# Patient Record
Sex: FEMALE | Race: WHITE | NOT HISPANIC OR LATINO | Employment: OTHER | URBAN - METROPOLITAN AREA
[De-identification: names, ages, dates, MRNs, and addresses within clinical notes are randomized per-mention and may not be internally consistent; named-entity substitution may affect disease eponyms.]

---

## 2018-11-01 ENCOUNTER — HOSPITAL ENCOUNTER (INPATIENT)
Facility: MEDICAL CENTER | Age: 83
LOS: 7 days | DRG: 054 | End: 2018-11-08
Attending: EMERGENCY MEDICINE | Admitting: STUDENT IN AN ORGANIZED HEALTH CARE EDUCATION/TRAINING PROGRAM
Payer: MEDICARE

## 2018-11-01 ENCOUNTER — HOSPITAL ENCOUNTER (OUTPATIENT)
Dept: RADIOLOGY | Facility: MEDICAL CENTER | Age: 83
End: 2018-11-01

## 2018-11-01 DIAGNOSIS — M62.82 NON-TRAUMATIC RHABDOMYOLYSIS: ICD-10-CM

## 2018-11-01 DIAGNOSIS — R53.1 WEAKNESS: ICD-10-CM

## 2018-11-01 DIAGNOSIS — G93.89 BRAIN MASS: ICD-10-CM

## 2018-11-01 DIAGNOSIS — I48.91 ATRIAL FIBRILLATION, UNSPECIFIED TYPE (HCC): ICD-10-CM
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
## 2018-11-01 PROBLEM — Z85.038 HISTORY OF COLON CANCER: Chronic | Status: ACTIVE | Noted: 2018-11-01

## 2018-11-01 PROBLEM — R40.20 LOSS OF CONSCIOUSNESS (HCC): Status: ACTIVE | Noted: 2018-11-01

## 2018-11-01 PROBLEM — E80.6 HYPERBILIRUBINEMIA: Status: ACTIVE | Noted: 2018-11-01

## 2018-11-01 PROBLEM — G51.0 BELL PALSY: Chronic | Status: ACTIVE | Noted: 2018-11-01

## 2018-11-01 PROBLEM — D32.9 MENINGIOMA (HCC): Chronic | Status: ACTIVE | Noted: 2018-11-01

## 2018-11-01 PROBLEM — H54.40 BLIND RIGHT EYE: Status: ACTIVE | Noted: 2018-11-01

## 2018-11-01 LAB — GLUCOSE BLD-MCNC: 121 MG/DL (ref 65–99)

## 2018-11-01 PROCEDURE — 700111 HCHG RX REV CODE 636 W/ 250 OVERRIDE (IP): Performed by: STUDENT IN AN ORGANIZED HEALTH CARE EDUCATION/TRAINING PROGRAM

## 2018-11-01 PROCEDURE — 700101 HCHG RX REV CODE 250: Performed by: STUDENT IN AN ORGANIZED HEALTH CARE EDUCATION/TRAINING PROGRAM

## 2018-11-01 PROCEDURE — 700105 HCHG RX REV CODE 258: Performed by: STUDENT IN AN ORGANIZED HEALTH CARE EDUCATION/TRAINING PROGRAM

## 2018-11-01 PROCEDURE — 82962 GLUCOSE BLOOD TEST: CPT

## 2018-11-01 PROCEDURE — 770020 HCHG ROOM/CARE - TELE (206)

## 2018-11-01 PROCEDURE — 99285 EMERGENCY DEPT VISIT HI MDM: CPT

## 2018-11-01 PROCEDURE — 96375 TX/PRO/DX INJ NEW DRUG ADDON: CPT

## 2018-11-01 PROCEDURE — 96374 THER/PROPH/DIAG INJ IV PUSH: CPT

## 2018-11-01 PROCEDURE — 700105 HCHG RX REV CODE 258: Performed by: EMERGENCY MEDICINE

## 2018-11-01 PROCEDURE — 93005 ELECTROCARDIOGRAM TRACING: CPT | Performed by: EMERGENCY MEDICINE

## 2018-11-01 RX ORDER — POLYVINYL ALCOHOL 14 MG/ML
1 SOLUTION/ DROPS OPHTHALMIC PRN
Status: ON HOLD | COMMUNITY
End: 2018-11-08

## 2018-11-01 RX ORDER — DEXAMETHASONE SODIUM PHOSPHATE 4 MG/ML
4 INJECTION, SOLUTION INTRA-ARTICULAR; INTRALESIONAL; INTRAMUSCULAR; INTRAVENOUS; SOFT TISSUE EVERY 6 HOURS
Status: DISCONTINUED | OUTPATIENT
Start: 2018-11-02 | End: 2018-11-02

## 2018-11-01 RX ORDER — DEXTROSE MONOHYDRATE 25 G/50ML
25 INJECTION, SOLUTION INTRAVENOUS
Status: DISCONTINUED | OUTPATIENT
Start: 2018-11-01 | End: 2018-11-08 | Stop reason: HOSPADM

## 2018-11-01 RX ORDER — LEVOTHYROXINE SODIUM 0.07 MG/1
75 TABLET ORAL
COMMUNITY

## 2018-11-01 RX ORDER — BISACODYL 10 MG
10 SUPPOSITORY, RECTAL RECTAL
Status: DISCONTINUED | OUTPATIENT
Start: 2018-11-01 | End: 2018-11-08 | Stop reason: HOSPADM

## 2018-11-01 RX ORDER — SODIUM CHLORIDE 9 MG/ML
1000 INJECTION, SOLUTION INTRAVENOUS ONCE
Status: COMPLETED | OUTPATIENT
Start: 2018-11-01 | End: 2018-11-01

## 2018-11-01 RX ORDER — IBUPROFEN 200 MG
200 TABLET ORAL EVERY 6 HOURS PRN
COMMUNITY

## 2018-11-01 RX ORDER — DEXAMETHASONE SODIUM PHOSPHATE 4 MG/ML
4 INJECTION, SOLUTION INTRA-ARTICULAR; INTRALESIONAL; INTRAMUSCULAR; INTRAVENOUS; SOFT TISSUE ONCE
Status: COMPLETED | OUTPATIENT
Start: 2018-11-01 | End: 2018-11-01

## 2018-11-01 RX ORDER — AMOXICILLIN 250 MG
2 CAPSULE ORAL 2 TIMES DAILY
Status: DISCONTINUED | OUTPATIENT
Start: 2018-11-01 | End: 2018-11-08 | Stop reason: HOSPADM

## 2018-11-01 RX ORDER — ACETAMINOPHEN 325 MG/1
650 TABLET ORAL EVERY 6 HOURS PRN
Status: DISCONTINUED | OUTPATIENT
Start: 2018-11-01 | End: 2018-11-08 | Stop reason: HOSPADM

## 2018-11-01 RX ORDER — LORAZEPAM 2 MG/ML
1 INJECTION INTRAMUSCULAR
Status: DISCONTINUED | OUTPATIENT
Start: 2018-11-01 | End: 2018-11-03

## 2018-11-01 RX ORDER — POLYETHYLENE GLYCOL 3350 17 G/17G
1 POWDER, FOR SOLUTION ORAL
Status: DISCONTINUED | OUTPATIENT
Start: 2018-11-01 | End: 2018-11-08 | Stop reason: HOSPADM

## 2018-11-01 RX ORDER — SODIUM CHLORIDE AND POTASSIUM CHLORIDE 150; 900 MG/100ML; MG/100ML
INJECTION, SOLUTION INTRAVENOUS CONTINUOUS
Status: DISCONTINUED | OUTPATIENT
Start: 2018-11-01 | End: 2018-11-02

## 2018-11-01 RX ADMIN — SODIUM CHLORIDE 1000 ML: 9 INJECTION, SOLUTION INTRAVENOUS at 16:45

## 2018-11-01 RX ADMIN — DEXAMETHASONE SODIUM PHOSPHATE 4 MG: 4 INJECTION, SOLUTION INTRAMUSCULAR; INTRAVENOUS at 17:00

## 2018-11-01 RX ADMIN — POTASSIUM CHLORIDE AND SODIUM CHLORIDE: 900; 150 INJECTION, SOLUTION INTRAVENOUS at 18:38

## 2018-11-01 RX ADMIN — SODIUM CHLORIDE 500 MG: 9 INJECTION, SOLUTION INTRAVENOUS at 17:15

## 2018-11-01 ASSESSMENT — LIFESTYLE VARIABLES
SUBSTANCE_ABUSE: 0
EVER_SMOKED: NEVER

## 2018-11-01 ASSESSMENT — ENCOUNTER SYMPTOMS
DOUBLE VISION: 0
FOCAL WEAKNESS: 0
CHILLS: 0
SHORTNESS OF BREATH: 0
COUGH: 0
DIARRHEA: 0
NAUSEA: 0
SINUS PAIN: 0
SORE THROAT: 0
VOMITING: 0
ABDOMINAL PAIN: 0
SPUTUM PRODUCTION: 0
INSOMNIA: 0
WEAKNESS: 1
MYALGIAS: 0
HEADACHES: 1
TREMORS: 0
DEPRESSION: 0
PALPITATIONS: 0
FALLS: 1
CONSTIPATION: 0
SPEECH CHANGE: 0
ORTHOPNEA: 0
BLURRED VISION: 0
SENSORY CHANGE: 0
BRUISES/BLEEDS EASILY: 0
DIZZINESS: 0
SEIZURES: 0
PND: 0
PHOTOPHOBIA: 0
BACK PAIN: 0
FEVER: 0
NERVOUS/ANXIOUS: 0
HALLUCINATIONS: 0
BLOOD IN STOOL: 0
DIAPHORESIS: 0

## 2018-11-01 ASSESSMENT — COGNITIVE AND FUNCTIONAL STATUS - GENERAL
DRESSING REGULAR LOWER BODY CLOTHING: A LOT
MOBILITY SCORE: 12
DRESSING REGULAR UPPER BODY CLOTHING: A LOT
HELP NEEDED FOR BATHING: A LOT
DAILY ACTIVITIY SCORE: 13
SUGGESTED CMS G CODE MODIFIER MOBILITY: CL
TOILETING: A LOT
STANDING UP FROM CHAIR USING ARMS: A LOT
PERSONAL GROOMING: A LOT
WALKING IN HOSPITAL ROOM: A LOT
TURNING FROM BACK TO SIDE WHILE IN FLAT BAD: A LOT
SUGGESTED CMS G CODE MODIFIER DAILY ACTIVITY: CL
MOVING TO AND FROM BED TO CHAIR: A LOT
MOVING FROM LYING ON BACK TO SITTING ON SIDE OF FLAT BED: A LOT
CLIMB 3 TO 5 STEPS WITH RAILING: A LOT
EATING MEALS: A LITTLE

## 2018-11-01 ASSESSMENT — PAIN SCALES - GENERAL
PAINLEVEL_OUTOF10: 0

## 2018-11-01 ASSESSMENT — COPD QUESTIONNAIRES: HAVE YOU SMOKED AT LEAST 100 CIGARETTES IN YOUR ENTIRE LIFE: NO/DON'T KNOW

## 2018-11-01 NOTE — ED PROVIDER NOTES
ED Provider Note    Scribed for Seamus Lawson M.D. by Reagan Camacho. 11/1/2018, 3:02 PM.    Primary care provider: Pcp Unobtainable By   Means of arrival: Ambulance  History obtained from: Patient  History limited by: Patient's altered level of consciousness.      CHIEF COMPLAINT  Chief Complaint   Patient presents with   • Possible Stroke     Transfer from [facility] due to brain tumor with midline shift and stroke like symptoms (right facial droop).  AOx2     HPI  Cris Pham is a 84 y.o. female who was transferred to the Emergency Department from [facility] with stroke like symptoms of right facial droop.    Per transfer report, the patient was found down by her neighbors, and was last seen at her baseline two days prior. The patient could not get off the floor and was seen at [facility].   The patient was noted to have right sided facial paralysis that is old. Her CT showed meningioma with midline shift, and she was transferred to this ED.     Transfer Labs: Complete metabolic panel:   chloride 100.   Anion gap 12.   Glucose 108.   Total protein 6.2.   Tbili 3.2 otherwise normal.   CPK is 659.   BNP is 243.  Venous pH 7.34.  Normal lipase. Alcohol non detectable. Normal TSH, Non detectable troponin.  WBC and hemoglobin are normal. UA shows no signs of infection.   Transfer EKG shows: Sinus tachycardia with rate of 111.     Further history of present illness is limited secondary to altered level of consciousness.       REVIEW OF SYSTEMS  Review of Systems   Unable to perform ROS: Mental acuity       PAST MEDICAL HISTORY   has a past medical history of Asthma; Heart murmur; Mitral regurgitation (1/17/2012); and UTI (lower urinary tract infection) (1/18/2012).    SURGICAL HISTORY   has a past surgical history that includes other orthopedic surgery.    SOCIAL HISTORY  Social History   Substance Use Topics   • Smoking status: Never Smoker   • Alcohol use No      History    Drug Use No     FAMILY HISTORY  None noted.     CURRENT MEDICATIONS  Reviewed. See ED encounter.     ALLERGIES  No Known Allergies    PHYSICAL EXAM  VITAL SIGNS: /74   Pulse (!) 111   Temp 36.7 °C (98.1 °F)   Resp 16   Wt 68 kg (150 lb)   SpO2 99%   BMI 24.40 kg/m²     Constitutional:  Awake within appropriate quesitoning, does not know the date.    HENT: Moist mucous membranes  Eyes: No conjunctivitis or icterus  Neck: trachea is midline, no palpable thyroid  Lymphatic:No cervical lymphadenopathy  Cardiovascular: Tachycardic rate, no murmurs  Thorax & Lungs: Normal breath sounds, no rhonchi  Abdomen: Soft, Non-tender  Skin:. no rash  Back: Non-tender, no CVA tenderness  Extremities:  Good DP and PT pulses. No edema  Vascular:  symmetric radial pulse  Neurologic: Complete paralysis of the right face With no blinking of the right eye lid. Diffuse non focal weakness, no other obvious cranial abnormality. Could not complete further neurological exam due to patient's inability to cooperate.     LABS  Labs Reviewed - No data to display  All labs reviewed by me.    EKG Interpretation  Interpreted by me  Ventricularly paced rhythm at a rate of 100.     RADIOLOGY  OUTSIDE IMAGES-DX CHEST   Final Result      OUTSIDE IMAGES-CT HEAD   Final Result        The radiologist's interpretation of all radiological studies have been reviewed by me.    COURSE & MEDICAL DECISION MAKING  Pertinent Labs & Imaging studies reviewed. (See chart for details)    3:02 PM - Patient seen and examined at bedside. Ordered EKG to evaluate her symptoms. .    The patient was treated with 250cc IV fluids per hour over a 4 hours for her elevated CPK of 659.     4:16 PM Paged [facility].     4:21 PM Discussed patient's condition with [facility].     Spoke with , Neurosurgery, about the patient's condition.     There was a improved hydration response response to IV fluids.       Medical Decision Making:   The  patient has a large meningioma masses increased in size with vasogenic edema and midline shift.  She has rhabdomyolysis I started IV fluids at 250 cc an hour for 4 hours.  Atrial fib with RVR was treated at the prior facility.  Patient is being given Decadron by the internal medicine resident team.  Of contacted Dr. Mayer for admission    Disposition:  Patient will be admitted to the hospital under the care of [facility] in guarded condition.     FINAL IMPRESSION  1. Weakness    2. Non-traumatic rhabdomyolysis    3. Brain mass    4. Atrial fibrillation, unspecified type (HCC)          IReagan (Scribe), am scribing for, and in the presence of, Seamus Lawson M.D..    Electronically signed by: Reagan Camacho (Scribe), 11/1/2018    ISeamus M.D. personally performed the services described in this documentation, as scribed by Reagan Camacho in my presence, and it is both accurate and complete.    The note accurately reflects work and decisions made by me.  Seamus Lawson  11/1/2018  5:16 PM

## 2018-11-02 PROBLEM — E03.9 HYPOTHYROIDISM: Chronic | Status: ACTIVE | Noted: 2018-11-02

## 2018-11-02 LAB
ALBUMIN SERPL BCP-MCNC: 4 G/DL (ref 3.2–4.9)
ALBUMIN/GLOB SERPL: 1.9 G/DL
ALP SERPL-CCNC: 62 U/L (ref 30–99)
ALT SERPL-CCNC: 15 U/L (ref 2–50)
ANION GAP SERPL CALC-SCNC: 9 MMOL/L (ref 0–11.9)
AST SERPL-CCNC: 24 U/L (ref 12–45)
BASOPHILS # BLD AUTO: 0.1 % (ref 0–1.8)
BASOPHILS # BLD: 0.01 K/UL (ref 0–0.12)
BILIRUB SERPL-MCNC: 1.8 MG/DL (ref 0.1–1.5)
BUN SERPL-MCNC: 34 MG/DL (ref 8–22)
CALCIUM SERPL-MCNC: 9.8 MG/DL (ref 8.5–10.5)
CHLORIDE SERPL-SCNC: 107 MMOL/L (ref 96–112)
CO2 SERPL-SCNC: 24 MMOL/L (ref 20–33)
CREAT SERPL-MCNC: 0.89 MG/DL (ref 0.5–1.4)
EKG IMPRESSION: NORMAL
EKG IMPRESSION: NORMAL
EOSINOPHIL # BLD AUTO: 0 K/UL (ref 0–0.51)
EOSINOPHIL NFR BLD: 0 % (ref 0–6.9)
ERYTHROCYTE [DISTWIDTH] IN BLOOD BY AUTOMATED COUNT: 41 FL (ref 35.9–50)
GLOBULIN SER CALC-MCNC: 2.1 G/DL (ref 1.9–3.5)
GLUCOSE SERPL-MCNC: 125 MG/DL (ref 65–99)
HCT VFR BLD AUTO: 41.7 % (ref 37–47)
HGB BLD-MCNC: 14.2 G/DL (ref 12–16)
IMM GRANULOCYTES # BLD AUTO: 0.01 K/UL (ref 0–0.11)
IMM GRANULOCYTES NFR BLD AUTO: 0.1 % (ref 0–0.9)
INR PPP: 1.09 (ref 0.87–1.13)
LYMPHOCYTES # BLD AUTO: 0.37 K/UL (ref 1–4.8)
LYMPHOCYTES NFR BLD: 5.1 % (ref 22–41)
MCH RBC QN AUTO: 29.6 PG (ref 27–33)
MCHC RBC AUTO-ENTMCNC: 34.1 G/DL (ref 33.6–35)
MCV RBC AUTO: 87.1 FL (ref 81.4–97.8)
MONOCYTES # BLD AUTO: 0.17 K/UL (ref 0–0.85)
MONOCYTES NFR BLD AUTO: 2.3 % (ref 0–13.4)
NEUTROPHILS # BLD AUTO: 6.71 K/UL (ref 2–7.15)
NEUTROPHILS NFR BLD: 92.4 % (ref 44–72)
NRBC # BLD AUTO: 0 K/UL
NRBC BLD-RTO: 0 /100 WBC
PLATELET # BLD AUTO: 186 K/UL (ref 164–446)
PMV BLD AUTO: 10.1 FL (ref 9–12.9)
POTASSIUM SERPL-SCNC: 4.3 MMOL/L (ref 3.6–5.5)
PROT SERPL-MCNC: 6.1 G/DL (ref 6–8.2)
PROTHROMBIN TIME: 14.2 SEC (ref 12–14.6)
RBC # BLD AUTO: 4.79 M/UL (ref 4.2–5.4)
SODIUM SERPL-SCNC: 140 MMOL/L (ref 135–145)
T4 FREE SERPL-MCNC: 1.19 NG/DL (ref 0.53–1.43)
TSH SERPL DL<=0.005 MIU/L-ACNC: 0.65 UIU/ML (ref 0.38–5.33)
WBC # BLD AUTO: 7.3 K/UL (ref 4.8–10.8)

## 2018-11-02 PROCEDURE — G8979 MOBILITY GOAL STATUS: HCPCS | Mod: CI

## 2018-11-02 PROCEDURE — 99221 1ST HOSP IP/OBS SF/LOW 40: CPT | Mod: AI,GC | Performed by: STUDENT IN AN ORGANIZED HEALTH CARE EDUCATION/TRAINING PROGRAM

## 2018-11-02 PROCEDURE — 700102 HCHG RX REV CODE 250 W/ 637 OVERRIDE(OP): Performed by: STUDENT IN AN ORGANIZED HEALTH CARE EDUCATION/TRAINING PROGRAM

## 2018-11-02 PROCEDURE — 97166 OT EVAL MOD COMPLEX 45 MIN: CPT

## 2018-11-02 PROCEDURE — G8997 SWALLOW GOAL STATUS: HCPCS | Mod: CI

## 2018-11-02 PROCEDURE — 36415 COLL VENOUS BLD VENIPUNCTURE: CPT

## 2018-11-02 PROCEDURE — 97162 PT EVAL MOD COMPLEX 30 MIN: CPT

## 2018-11-02 PROCEDURE — 700111 HCHG RX REV CODE 636 W/ 250 OVERRIDE (IP): Performed by: STUDENT IN AN ORGANIZED HEALTH CARE EDUCATION/TRAINING PROGRAM

## 2018-11-02 PROCEDURE — G8978 MOBILITY CURRENT STATUS: HCPCS | Mod: CJ

## 2018-11-02 PROCEDURE — 84439 ASSAY OF FREE THYROXINE: CPT

## 2018-11-02 PROCEDURE — 85610 PROTHROMBIN TIME: CPT

## 2018-11-02 PROCEDURE — A9270 NON-COVERED ITEM OR SERVICE: HCPCS | Performed by: STUDENT IN AN ORGANIZED HEALTH CARE EDUCATION/TRAINING PROGRAM

## 2018-11-02 PROCEDURE — 85025 COMPLETE CBC W/AUTO DIFF WBC: CPT

## 2018-11-02 PROCEDURE — 93005 ELECTROCARDIOGRAM TRACING: CPT | Performed by: STUDENT IN AN ORGANIZED HEALTH CARE EDUCATION/TRAINING PROGRAM

## 2018-11-02 PROCEDURE — 84443 ASSAY THYROID STIM HORMONE: CPT

## 2018-11-02 PROCEDURE — G8996 SWALLOW CURRENT STATUS: HCPCS | Mod: CJ

## 2018-11-02 PROCEDURE — G8988 SELF CARE GOAL STATUS: HCPCS | Mod: CI

## 2018-11-02 PROCEDURE — 80053 COMPREHEN METABOLIC PANEL: CPT

## 2018-11-02 PROCEDURE — 93010 ELECTROCARDIOGRAM REPORT: CPT | Performed by: INTERNAL MEDICINE

## 2018-11-02 PROCEDURE — G8987 SELF CARE CURRENT STATUS: HCPCS | Mod: CK

## 2018-11-02 PROCEDURE — 700105 HCHG RX REV CODE 258: Performed by: STUDENT IN AN ORGANIZED HEALTH CARE EDUCATION/TRAINING PROGRAM

## 2018-11-02 PROCEDURE — 92610 EVALUATE SWALLOWING FUNCTION: CPT

## 2018-11-02 PROCEDURE — 770020 HCHG ROOM/CARE - TELE (206)

## 2018-11-02 PROCEDURE — 700101 HCHG RX REV CODE 250: Performed by: STUDENT IN AN ORGANIZED HEALTH CARE EDUCATION/TRAINING PROGRAM

## 2018-11-02 RX ORDER — ASPIRIN 325 MG
325 TABLET ORAL ONCE
Status: COMPLETED | OUTPATIENT
Start: 2018-11-02 | End: 2018-11-02

## 2018-11-02 RX ORDER — LORAZEPAM 1 MG/1
0.5 TABLET ORAL ONCE
Status: DISCONTINUED | OUTPATIENT
Start: 2018-11-02 | End: 2018-11-02

## 2018-11-02 RX ORDER — ASPIRIN 81 MG/1
81 TABLET, CHEWABLE ORAL DAILY
Status: DISCONTINUED | OUTPATIENT
Start: 2018-11-03 | End: 2018-11-08 | Stop reason: HOSPADM

## 2018-11-02 RX ORDER — POLYVINYL ALCOHOL 14 MG/ML
1 SOLUTION/ DROPS OPHTHALMIC 2 TIMES DAILY
Status: DISCONTINUED | OUTPATIENT
Start: 2018-11-03 | End: 2018-11-08 | Stop reason: HOSPADM

## 2018-11-02 RX ORDER — LORAZEPAM 0.5 MG/1
0.5 TABLET ORAL
Status: DISCONTINUED | OUTPATIENT
Start: 2018-11-02 | End: 2018-11-03

## 2018-11-02 RX ORDER — DILTIAZEM HYDROCHLORIDE 5 MG/ML
10 INJECTION INTRAVENOUS ONCE
Status: DISCONTINUED | OUTPATIENT
Start: 2018-11-02 | End: 2018-11-02

## 2018-11-02 RX ORDER — LEVOTHYROXINE SODIUM 0.07 MG/1
75 TABLET ORAL
Status: DISCONTINUED | OUTPATIENT
Start: 2018-11-02 | End: 2018-11-08 | Stop reason: HOSPADM

## 2018-11-02 RX ORDER — SODIUM CHLORIDE 9 MG/ML
INJECTION, SOLUTION INTRAVENOUS CONTINUOUS
Status: DISCONTINUED | OUTPATIENT
Start: 2018-11-02 | End: 2018-11-03

## 2018-11-02 RX ORDER — POLYVINYL ALCOHOL 14 MG/ML
1 SOLUTION/ DROPS OPHTHALMIC PRN
Status: DISCONTINUED | OUTPATIENT
Start: 2018-11-02 | End: 2018-11-02

## 2018-11-02 RX ADMIN — SODIUM CHLORIDE 500 MG: 9 INJECTION, SOLUTION INTRAVENOUS at 05:56

## 2018-11-02 RX ADMIN — ASPIRIN 325 MG: 325 TABLET, COATED ORAL at 14:41

## 2018-11-02 RX ADMIN — SODIUM CHLORIDE: 9 INJECTION, SOLUTION INTRAVENOUS at 19:51

## 2018-11-02 RX ADMIN — DEXAMETHASONE SODIUM PHOSPHATE 4 MG: 4 INJECTION, SOLUTION INTRAMUSCULAR; INTRAVENOUS at 00:22

## 2018-11-02 RX ADMIN — ACETAMINOPHEN 650 MG: 325 TABLET, FILM COATED ORAL at 19:50

## 2018-11-02 RX ADMIN — DEXAMETHASONE SODIUM PHOSPHATE 4 MG: 4 INJECTION, SOLUTION INTRAMUSCULAR; INTRAVENOUS at 05:56

## 2018-11-02 RX ADMIN — ACETAMINOPHEN 650 MG: 325 TABLET, FILM COATED ORAL at 13:34

## 2018-11-02 RX ADMIN — SODIUM CHLORIDE: 9 INJECTION, SOLUTION INTRAVENOUS at 16:31

## 2018-11-02 RX ADMIN — POLYVINYL ALCOHOL 1 DROP: 14 SOLUTION/ DROPS OPHTHALMIC at 13:31

## 2018-11-02 RX ADMIN — POTASSIUM CHLORIDE AND SODIUM CHLORIDE: 900; 150 INJECTION, SOLUTION INTRAVENOUS at 05:57

## 2018-11-02 ASSESSMENT — GAIT ASSESSMENTS
ASSISTIVE DEVICE: FRONT WHEEL WALKER
GAIT LEVEL OF ASSIST: STAND BY ASSIST
DEVIATION: BRADYKINETIC;ATAXIC
DISTANCE (FEET): 80

## 2018-11-02 ASSESSMENT — PAIN SCALES - GENERAL
PAINLEVEL_OUTOF10: 0
PAINLEVEL_OUTOF10: 4
PAINLEVEL_OUTOF10: 0
PAINLEVEL_OUTOF10: 0

## 2018-11-02 ASSESSMENT — ENCOUNTER SYMPTOMS
BLURRED VISION: 0
INSOMNIA: 0
PHOTOPHOBIA: 0
SPUTUM PRODUCTION: 0
MYALGIAS: 0
FOCAL WEAKNESS: 0
PALPITATIONS: 0
NERVOUS/ANXIOUS: 0
ABDOMINAL PAIN: 0
DIZZINESS: 0
SPEECH CHANGE: 0
DEPRESSION: 0
SORE THROAT: 0
VOMITING: 0
SHORTNESS OF BREATH: 0
SEIZURES: 0
HALLUCINATIONS: 0
EYE DISCHARGE: 1
COUGH: 0
WEIGHT LOSS: 0
LOSS OF CONSCIOUSNESS: 0
FEVER: 0
TINGLING: 0
CONSTIPATION: 0
EYE PAIN: 0
BACK PAIN: 0
SENSORY CHANGE: 0
TREMORS: 0
BRUISES/BLEEDS EASILY: 0
DOUBLE VISION: 0
ORTHOPNEA: 0
CHILLS: 0
NAUSEA: 0
HEADACHES: 0
DIARRHEA: 0
SINUS PAIN: 0
EYE REDNESS: 0

## 2018-11-02 ASSESSMENT — COGNITIVE AND FUNCTIONAL STATUS - GENERAL
DRESSING REGULAR LOWER BODY CLOTHING: A LOT
SUGGESTED CMS G CODE MODIFIER DAILY ACTIVITY: CK
SUGGESTED CMS G CODE MODIFIER MOBILITY: CK
STANDING UP FROM CHAIR USING ARMS: A LITTLE
MOBILITY SCORE: 15
MOVING FROM LYING ON BACK TO SITTING ON SIDE OF FLAT BED: A LITTLE
CLIMB 3 TO 5 STEPS WITH RAILING: A LOT
SUGGESTED CMS G CODE MODIFIER DAILY ACTIVITY: CK
DRESSING REGULAR UPPER BODY CLOTHING: A LOT
EATING MEALS: A LITTLE
SUGGESTED CMS G CODE MODIFIER MOBILITY: CK
PERSONAL GROOMING: A LITTLE
TURNING FROM BACK TO SIDE WHILE IN FLAT BAD: A LITTLE
MOVING TO AND FROM BED TO CHAIR: A LITTLE
STANDING UP FROM CHAIR USING ARMS: A LOT
WALKING IN HOSPITAL ROOM: A LITTLE
CLIMB 3 TO 5 STEPS WITH RAILING: A LOT
MOVING FROM LYING ON BACK TO SITTING ON SIDE OF FLAT BED: A LITTLE
TOILETING: A LITTLE
EATING MEALS: A LITTLE
PERSONAL GROOMING: A LOT
MOVING TO AND FROM BED TO CHAIR: A LITTLE
MOBILITY SCORE: 17
DAILY ACTIVITIY SCORE: 14
DRESSING REGULAR UPPER BODY CLOTHING: A LOT
HELP NEEDED FOR BATHING: A LOT
DAILY ACTIVITIY SCORE: 16
TOILETING: A LOT
HELP NEEDED FOR BATHING: A LITTLE
WALKING IN HOSPITAL ROOM: A LOT
TURNING FROM BACK TO SIDE WHILE IN FLAT BAD: A LITTLE
DRESSING REGULAR LOWER BODY CLOTHING: A LITTLE

## 2018-11-02 ASSESSMENT — PATIENT HEALTH QUESTIONNAIRE - PHQ9
1. LITTLE INTEREST OR PLEASURE IN DOING THINGS: NOT AT ALL
SUM OF ALL RESPONSES TO PHQ9 QUESTIONS 1 AND 2: 0
SUM OF ALL RESPONSES TO PHQ9 QUESTIONS 1 AND 2: 0
2. FEELING DOWN, DEPRESSED, IRRITABLE, OR HOPELESS: NOT AT ALL
1. LITTLE INTEREST OR PLEASURE IN DOING THINGS: NOT AT ALL
2. FEELING DOWN, DEPRESSED, IRRITABLE, OR HOPELESS: NOT AT ALL

## 2018-11-02 ASSESSMENT — LIFESTYLE VARIABLES
EVER_SMOKED: NEVER
SUBSTANCE_ABUSE: 0
ALCOHOL_USE: NO

## 2018-11-02 ASSESSMENT — ACTIVITIES OF DAILY LIVING (ADL): TOILETING: INDEPENDENT

## 2018-11-02 NOTE — SENIOR ADMIT NOTE
SENIOR ADMIT NOTE:    CC:  Ground level fall  Large meningioma with mass affect   Atrial fibrillation    HPI in summary:   83 y/o pleasant female that is hard of hearing and only replies accurately if you write something for her and she reads it and responds. She is fully alert and oriented to self, year, but thinks she is at a rehab facility in [place] ([place]). She was found down in home after neighbors called EMS and was then noted to be in paced atrial fibrillation and CT showed large mass with midline shift so patient was transferred to [facility].    Patient describes falling on her bottom due to her left leg buckling and not really being able to get up after that. She did not lose consciousness and was awake the entire time. She said she tried to save her head and instead hurt her rear end. She banged against the wall towards her neighbors so that they would hear her.     She has no acute complaints, but did mention having some bottom pain where she fell.     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 Velasquez Marin in January 2012.     Pertinent physical exam findings:    Patient was able to follow instructions somewhat. Appears to have 4.5/5 b/l upper extremity strength and patient able to lift lower extermities somewhat, unable to assess strength accurately. Moving all extremities and neck as needed. Able to turn to the side in the bed.   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   Normal babinski  Unable to elicit knee reflex, likely related to age  Visual exam limited by patients baseline right vision loss, but able to count fingers with right  eye.     Irregular rhythm with distal pulses present and warm extremities. Lung and abdominal exam unremarkable.     Pertinent studies:   Patients labs from [place] showed unremarkable CBC and CMP other than elevated total bilirubin to >3 with normal LFTs, slight AG of 12, normal TSH, grossly unremarkable ABG.   UA unremarkable.   Outside CXR- left costophrenic angle blunting, sternotomy hardware visible, and what appears to be a mitral valve (given history of rheumatic heart disease), likely mechanical since its visible on CXR as opacity  Most significant study was her outside CT which showed easily visible large mass with midline shift.      Assessment and plan in summary:  Problems list:  Large meningioma with mass affect   Atrial fibrillation  Ground level fall    Plan  Admit to neurosurgery with telemetry  Q4 neuro checks for now  Seizure, fall, aspirations precautions  Diet following SLP eval  IV decadron 4mg q6 and prophylactic keppra 500mg BID  neurosurgery consulted by EDP, Dr. Lyman to evaluate patient, appreciate recs  IVF maintenance at 83      Amauri Jasso- PGY 2  For detailed assessment and plan, please see Intern note

## 2018-11-02 NOTE — H&P
Internal Medicine Admitting History and Physical    Note Author: Catarino Moctezuma M.D.       Name Cris Pham 1934   Age/Sex 84 y.o. female   MRN [id number]   Code Status DNAR/DNI     After 5PM or if no immediate response to page, please call for cross-coverage  Attending/Team: Dr Palm/ Ayanna See Patient List for primary contact information  Call [phone] to page    1st Call - Day Intern (R1):   Dr Moctezuma 2nd Call - Day Sr. 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  She is able to understand speech when it is written out for her - she states that she tripped when her knees buckled and fell down injuring her buttocks but not her head and she was unable to move for several hours. She was able to alert her neighbor who took her to [facility] where her CT and MRI brain showed an increase in size of her meningioma. She remained conscious throughout according to herself and was found to have a highly elevated CPK on presentation but otherwise normal labs including blood alcohol, Troponin and BNP.    Review of Systems   Constitutional: Negative for chills, diaphoresis, fever and malaise/fatigue.   HENT: Positive for hearing loss. Negative for congestion, ear discharge, ear pain, sinus pain, sore throat and tinnitus.         Bilateral profound hearing loss   Eyes: Negative for blurred vision, double vision and photophobia.         Right eye blindness   Respiratory: Negative for cough, sputum production and shortness of breath.    Cardiovascular: Negative for chest pain, palpitations, orthopnea, leg swelling and PND.   Gastrointestinal: Negative for abdominal pain, blood in stool, constipation, diarrhea, melena, nausea and vomiting.   Genitourinary: Negative for dysuria, frequency and urgency.   Musculoskeletal: Positive for falls and joint pain. Negative for back pain and myalgias.        Pain in buttocks  Right Knee joint pain   Skin: Negative for itching and rash.   Neurological: Positive for weakness and headaches. Negative for dizziness, tremors, sensory change, speech change, focal weakness and seizures.        Occasional headache, takes Aleve PRN  Possible LOC   Endo/Heme/Allergies: Negative for environmental allergies. Does not bruise/bleed easily.   Psychiatric/Behavioral: Negative for depression, hallucinations, substance abuse and suicidal ideas. The patient is not nervous/anxious and does not have insomnia.              Past Medical History (Chronic medical problem, known complications and current treatment)    Afib with RVR on pacing  Hypothyroidism  Meningioma  Colon cancer  Right eye blindness  Left eye cataract  B/l hearing loss     Past Surgical History:  Past Surgical History:   Procedure Laterality Date   • OTHER ORTHOPEDIC SURGERY         Current Outpatient Medications:  Home Medications     Reviewed by Betty Urbina (Pharmacy Tech) on 11/01/18 at [employer]  Med List Status: Complete   Medication Last Dose Status   artificial tears 1.4 % Solution  Active   ibuprofen (MOTRIN) 200 MG Tab  Active   levothyroxine (SYNTHROID) 75 MCG Tab  Active                Medication Allergy/Sensitivities:  No Known Allergies      Family History (mandatory)   No family history on file.    Social History (mandatory)   Social History     Social History   • Marital status: Single     Spouse name: N/A   • Number of children: N/A   • Years of  education: N/A     Occupational History   • Not on file.     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  /65   Pulse (!) 113   Temp 36.7 °C (98.1 °F)   Resp 16   Wt 68 kg (150 lb)   SpO2 99%   BMI 24.40 kg/m²   O2 therapy: Pulse Oximetry: 99 %, O2 (LPM): 2, O2 Delivery: Nasal Cannula    Physical Exam   Constitutional: She is oriented to person, place, and time and well-developed, well-nourished, and in no distress. No distress.   HENT:   Head: Normocephalic and atraumatic.   Right Ear: External ear normal.   Left Ear: External ear normal.   Nose: Nose normal.   Eyes: Pupils are equal, round, and reactive to light. Conjunctivae and EOM are normal. Right eye exhibits discharge. Left eye exhibits no discharge. No scleral icterus.   Whitish thick discharge in right eye, non purulent   Neck: Normal range of motion. Neck supple. No JVD present.   Cardiovascular: Regular rhythm, normal heart sounds and intact distal pulses.    No murmur heard.  tachycardia   Pulmonary/Chest: Effort normal and breath sounds normal. No respiratory distress. She has no rales.   Abdominal: Soft. Bowel sounds are normal. She exhibits no distension. There is no tenderness. There is no guarding.   Laparotomy scar in midline   Genitourinary:   Genitourinary Comments: declined   Musculoskeletal: Normal range of motion. She exhibits no edema, tenderness or deformity.   No knee joint tenderness    Lymphadenopathy:     She has no cervical adenopathy.   Neurological: She is alert and oriented to person, place, and time. She displays normal reflexes. A cranial nerve deficit is present. She exhibits normal muscle tone. Coordination normal. GCS score is 15.   Skin: Skin is warm and dry. No rash noted. She is not diaphoretic. No erythema.   Psychiatric: Mood, affect and judgment normal.   Vitals reviewed.        Data Review       Old Records Request:   Completed  Current Records review/summary: Completed    Lab Data Review:  Recent Results (from the past 24 hour(s))   EKG (NOW)    Collection Time: 18  3:35 PM   Result Value Ref Range    Report       [facility] Emergency Dept.    Test Date:  2018  Pt Name:    COLEEN WU               Department: ER  MRN:        [id number]                      Room:       [facility]  Gender:     Female                       Technician: [id number]  :        1934                   Requested By:ALYSIA NO  Order #:    [id number]                    Reading MD:    Measurements  Intervals                                Axis  Rate:       100                          P:          88  IL:         104                          QRS:        37  QRSD:       138                          T:  QT:         236  QTc:        305    Interpretive Statements  VENTRICULAR-PACED COMPLEXES  NO FURTHER RHYTHM ANALYSIS ATTEMPTED DUE TO PACED RHYTHM  LEFT BUNDLE BRANCH BLOCK  ARTIFACT IN LEAD(S) II,aVR,aVF,V1,V2,V4,V5,V6  Compared to ECG 2012 08:37:48  Left bundle-branch block now present  Sinus rhythm no longer present         Imaging/Procedures Review:    Independant Imaging Review: Completed  OUTSIDE IMAGES-DX CHEST   Final Result      OUTSIDE IMAGES-CT HEAD   Final Result           EKG:   EKG Independant Review: Completed  QTc:305, HR: 104, VENTRICULAR-PACED COMPLEXES   NO FURTHER RHYTHM ANALYSIS ATTEMPTED DUE TO PACED RHYTHM   LEFT BUNDLE BRANCH BLOCK     Records  reviewed and summarized in current documentation :  Yes  [facility] teaching service handout given to patient:  No         Assessment/Plan     * Fall from ground level   Assessment & Plan    - pt fell and injured buttocks, did not injure head or lose consciousness per hx  - was unable to move, alerted neighbors  - increase in size of her meningioma; concern for mass effect or seizure causing LOC  - hx of unsteadiness on feet due to knee buckling but possible orthostatic effect  - also possible is CVA, hypoglycemia and hypovolemia    Plan  - telemetry  - aspiration, seizure and fall precautions  - Neurosurgery consult  - SLP evaluation and treat  - Keppra  - Decadron            Atrial fibrillation (HCC)   Assessment & Plan    - pacemaker in situ  - likely secondary to chronic MR  - tachycardia at 110s    Plan  - Telemetry  - consider beta blocker PRN if RVR        Hyperbilirubinemia   Assessment & Plan    - elevated indirect bilirubin  - 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  - CTM        History of colon cancer   Assessment & Plan    - S/P resectional surgery x 3  - currently no GI compliants  - CTM  - consider FOBT        Blind right eye   Assessment & Plan    - unclear etiology per caregiver  - complete loss of vision  - left eye retains vision but has cataract per hx    Plan  - fall precautions  - CTM        Bell palsy   Assessment & Plan    - 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, states secondary to radiation  - pt unable to wrinkle forehead  - Ddx: new onset CVA in MCA territory    Plan  - CTM        Meningioma (HCC)   Assessment & Plan    - diagnosed 2012, managed conservatively and with radiation  - 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 bone  - 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, no current tinnitus  - recent fall/ LOC, possible expansion of mass-   - erosion of petrous bone, midline shift and vasogenic edema, rapid increase in size of lesion      Plan  - Decadron IV  - Keppra loading dose and  scheduled 500 mg BID  - Neurosurgery consult  - Telemetry          Hearing impairment- (present on admission)   Assessment & Plan    - 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            Anticipated Hospital stay:  >2 midnights        Quality Measures  Quality-Core Measures   Reviewed items::  Labs reviewed, Medications reviewed, EKG reviewed and Radiology images reviewed  Paul catheter::  No Paul  DVT prophylaxis - mechanical:  SCDs  Ulcer Prophylaxis::  No    PCP: Pcp Unobtainable By

## 2018-11-02 NOTE — PROGRESS NOTES
Internal Medicine Interval Note  Note Author: Catarino Moctezuma M.D.     Name Cris Pham 1934   Age/Sex 84 y.o. female   MRN [id number]   Code Status DNAR/DNI     After 5PM or if no immediate response to page, please call for cross-coverage  Attending/Team: Dr Palm/ Salena See Patient List for primary contact information  Call [phone] to page    1st Call - Day Intern (R1):   Dr Moctezuma 2nd Call - Day Sr. Resident (R2/R3):   Dr Jasso         Reason for interval visit  (Principal Problem)   Atrial fibrillation  Meningioma  B/l hearing loss  GLF      Interval Problem Daily Status Update  (24 hours, problem oriented, brief subjective history, new lab/imaging data pertinent to that problem)   - Pt seen and examined at bedside this AM, stable and NAD. 30 sec of PSVT overnight.   - Afib with RVR ~ midday today - spontaneously resolved - transferred to telemetry - in sinus tachycardia - ventricular rate in 100s range  - Telemetry to continue - to start Metoprolol tartrate if sustained tachy and BP WNL  - EKG wnl, sinus tachycardia  - Echo ordered  - IVF restarted at maintenance rate  - no motor weakness - NIHSS 0 this AM  - no seizure activity, pt does not want treatment for meningioma  - Neurosurgery Dr Lyman consulted - no need for MRI as pt does not want treatment - no need for Keppra or Decadron at present time as no signs of raised ICP  - PT on board, recommend walker, likely risk of further falls in present state of functionality  - dry eyes secondary to ptosis - eye drops PRN  - CPK to be trended    - Josh De Leon (pt's landlord/ caregiver) - [phone] for further correspondence - he will visit pt on       Review of Systems   Constitutional: Negative for chills, fever and weight loss.   HENT: Positive for hearing loss. Negative for congestion, ear discharge, ear pain, sinus pain, sore throat and tinnitus.    Eyes: Positive for discharge. Negative for blurred vision, double vision,  photophobia, pain and redness.        Blindness in right eye  Mucus discharge from right eye   Respiratory: Negative for cough, sputum production and shortness of breath.    Cardiovascular: Negative for chest pain, palpitations, orthopnea and leg swelling.   Gastrointestinal: Negative for abdominal pain, constipation, diarrhea, nausea and vomiting.   Genitourinary: Negative for dysuria, frequency and urgency.   Musculoskeletal: Negative for back pain, joint pain and myalgias.   Skin: Negative for itching and rash.   Neurological: Negative for dizziness, tingling, tremors, sensory change, speech change, focal weakness, seizures, loss of consciousness and headaches.   Endo/Heme/Allergies: Negative for environmental allergies. Does not bruise/bleed easily.   Psychiatric/Behavioral: Negative for depression, hallucinations, substance abuse and suicidal ideas. The patient is not nervous/anxious and does not have insomnia.        Disposition/Barriers to discharge:   Likely rehab as some instability and concern for immediate independence    Consultants/Specialty  Dr Esmer KIRKLAND - Neurosurgery  PCP: Pcp Unobtainable By       Quality Measures  Quality-Core Measures   Reviewed items::  EKG reviewed, Labs reviewed, Medications reviewed and Radiology images reviewed  Paul catheter::  No Paul  DVT prophylaxis - mechanical:  SCDs  Ulcer Prophylaxis::  No          Physical Exam       Vitals:    11/02/18 1300 11/02/18 1435 11/02/18 1500 11/02/18 1545   BP: 125/70 117/67 107/64 111/63   Pulse: (!) 131 (!) 108  (!) 110   Resp:  18  18   Temp:  36.8 °C (98.3 °F)  36.4 °C (97.6 °F)   SpO2:  91%  92%   Weight:  67.7 kg (149 lb 4 oz)     Height:         Body mass index is 24.84 kg/m². Weight: 67.7 kg (149 lb 4 oz)  Oxygen Therapy:  Pulse Oximetry: 92 %, O2 (LPM): 0, O2 Delivery: None (Room Air)    Physical Exam   Constitutional: She is oriented to person, place, and time and well-developed, well-nourished, and in no distress. No  distress.   HENT:   Head: Normocephalic and atraumatic.   Right Ear: External ear normal.   Left Ear: External ear normal.   Nose: Nose normal.   Mouth/Throat: No oropharyngeal exudate.   Eyes: Pupils are equal, round, and reactive to light. Conjunctivae and EOM are normal. Right eye exhibits no discharge. Left eye exhibits no discharge. No scleral icterus.   Right sided ptosis   Neck: Normal range of motion. Neck supple. No JVD present.   Cardiovascular: Normal heart sounds and intact distal pulses.    No murmur heard.  Afib --> sinus tachycardia   Pulmonary/Chest: Effort normal and breath sounds normal. No respiratory distress. She has no rales.   Abdominal: Soft. There is no tenderness. There is no guarding.   Genitourinary:   Genitourinary Comments: declined   Musculoskeletal: Normal range of motion. She exhibits no edema, tenderness or deformity.   Assisted gait - walker   Lymphadenopathy:     She has no cervical adenopathy.   Neurological: She is alert and oriented to person, place, and time. She exhibits normal muscle tone. GCS score is 15.   Skin: Skin is warm and dry. No rash noted. She is not diaphoretic. No erythema.   Psychiatric: Mood and affect normal.   Vitals reviewed.            Assessment/Plan     * Fall from ground level   Assessment & Plan    - pt fell and injured buttocks, did not injure head or lose consciousness per hx  - was unable to move, alerted neighbors  - likely instability causing fall    Plan  - telemetry  - aspiration, seizure and fall precautions          Atrial fibrillation (HCC)   Assessment & Plan    - no pacemaker in situ  - likely secondary to chronic MR  - RVR to 140s, trending downward (104)  - EKG - sinus tachycardia    Plan  - Telemetry  - Echo  - consider Metoprolol scheduled if sustained        Meningioma (HCC)   Assessment & Plan    - diagnosed 2012, managed conservatively and with radiation  - 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 bone  - 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, no current tinnitus  -  recent fall/ LOC, possible expansion of mass-   - erosion of petrous bone, midline shift and vasogenic edema, rapid increase in size of lesion  - pt refuses treatment of meningioma, Neurosurgery recommends discontinuation of Keppra and Decadron as no evidence of raised ICP    Plan  - Telemetry          Hypothyroidism   Assessment & Plan    - TSH 0.65, free T4 1.19, WNL  - continue home dose of Synthroid 75 mcg        Hyperbilirubinemia   Assessment & Plan    - elevated indirect bilirubin  - 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  - CTM        History of colon cancer   Assessment & Plan    - S/P resectional surgery x 3  - currently no GI compliants  - CTM  - consider FOBT        Blind right eye   Assessment & Plan    - unclear etiology per caregiver  - complete loss of vision  - left eye retains vision but has cataract per hx    Plan  - fall precautions  - CTM        Bell palsy   Assessment & Plan    - 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

## 2018-11-02 NOTE — ASSESSMENT & PLAN NOTE
- unclear etiology per caregiver  - complete loss of vision  - left eye retains vision but has cataract per hx    Plan  - fall precautions  - eye drops for lubrication  - eye patch

## 2018-11-02 NOTE — ASSESSMENT & PLAN NOTE
- profound b/l hearing loss secondary to radiation for her meningioma per caregiver hx  - able to understand written words in print  - oriented x 4, MMSE WNL, expresses understanding, capacity intact

## 2018-11-02 NOTE — PROGRESS NOTES
ER RN's came up to interrogate pacer and could not find a pacemaker. I asked patient if she had one and she said no. Notified MD.

## 2018-11-02 NOTE — CARE PLAN
Problem: Knowledge Deficit  Goal: Knowledge of disease process/condition, treatment plan, diagnostic tests, and medications will improve  Outcome: PROGRESSING AS EXPECTED  POC discussed with patient. All questions answered.

## 2018-11-02 NOTE — THERAPY
"Physical Therapy Evaluation completed.   Bed Mobility:  Supine to Sit: Modified Independent  Transfers: Sit to Stand: Supervised  Gait: Level Of Assist: Stand by Assist with Front-Wheel Walker       Plan of Care: Will benefit from Physical Therapy 2 times per week  Discharge Recommendations: Equipment: Will Continue to Assess for Equipment Needs.    Ms. Pham is an 83 y/o female who presents to acute secondary to fall. Found to have increased size of meningioma but non-operative at this time. Functionally pt is able to perform gait, transfers, and bed mobility without physical assist. She does demonstrate significant lateral instability when standing which makes FWW safest assistive device. Pt informed therapist her L LE frequently deandra when ambulating with her cane, however she refuses to use her FWW at home. Pt withpoor insight into deficits, unable to comprehend that she may be at a new physical baseline. Based on her lack of insight and instability without FWW, is a high fall risk if she were to discharge back to her prior living situation. Anticipate pt will a higher level of care. Due to her cognitive impairments recommend 24/7 supervision such as a group home. Again if she were to discharge home alone would be a high fall risk due to cognitive deficits. As physically she is able to perform all mobility without assistance, PT to follow primarily for DC needs. Will be available for stair training as well if determined cognitively pt is safe to discharge back to [facility].     See \"Rehab Therapy-Acute\" Patient Summary Report for complete documentation.     "

## 2018-11-02 NOTE — PROGRESS NOTES
Patient went into a-fib around 1215pm. A&ox3 and no complaints of acute pain or discomfort. VSS. Patient resting in bed and only complains of pain to her lower back where she fell before she came in. Called [facility] x2 times and got a hold of the MD. MD put in order to admin caridzem IV push but we are unable to push medication on this floor for patient will be transferred to Tele 7. Tele 7 nurses on the way down. Safety precautions maintained.

## 2018-11-02 NOTE — ASSESSMENT & PLAN NOTE
- diagnosed 2012, managed conservatively and with radiation  - 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 bone  - SNHL, no current tinnitus  - erosion of petrous bone, midline shift and vasogenic edema, rapid increase in size of lesion  - asymptomatic  - pt refuses treatment of meningioma  - discontinuation of Keppra and Decadron as no evidence of raised ICP  - if acute changes in mentation or neurological status can re consult neurosurgery

## 2018-11-02 NOTE — PROGRESS NOTES
Monitor room called stating pt has been in sinus tachycardia  HR since she has been on our floor. [facility] yellow paged regarding update and whether or not to still give diltiazem.

## 2018-11-02 NOTE — PROGRESS NOTES
Neurosurgery Progress Note    Subjective:  Seen and assessed by Dr Esmer BHARDWAJ  Communication by reading     Exam:  Pt appropriate historian.  Moves arms equally. R facial droop baseline    BP  Min: 93/56  Max: 139/104  Pulse  Av  Min: 97  Max: 114  Resp  Av.8  Min: 16  Max: 18  Temp  Av.7 °C (98.1 °F)  Min: 36.4 °C (97.5 °F)  Max: 37 °C (98.6 °F)  SpO2  Av.3 %  Min: 93 %  Max: 99 %    No Data Recorded    Recent Labs      18   0307   WBC  7.3   RBC  4.79   HEMOGLOBIN  14.2   HEMATOCRIT  41.7   MCV  87.1   MCH  29.6   MCHC  34.1   RDW  41.0   PLATELETCT  186   MPV  10.1     Recent Labs      18   0307   SODIUM  140   POTASSIUM  4.3   CHLORIDE  107   CO2  24   GLUCOSE  125*   BUN  34*   CREATININE  0.89   CALCIUM  9.8     Recent Labs      18   0833   INR  1.09           Intake/Output       18 0700 - 18 0659 18 0700 - 18 0659      [id number] 6160-8795 Total 8645-0815 6436-3736 Total       Intake    I.V.  1000  913 1913  --  -- --    Volume (mL) (NS infusion 1,000 mL) 1000 -- 1000 -- -- --    Volume (mL) (0.9 % NaCl with KCl 20 mEq infusion) -- 913 913 -- -- --    Total Intake 4772 942 4937 -- -- --       Output    Urine  --  -- --  --  -- --    Number of Times Voided -- 2 x 2 x -- -- --    Total Output -- -- -- -- -- --       Net I/O     0634 568 4293 -- -- --            Intake/Output Summary (Last 24 hours) at 18 1204  Last data filed at 18 0600   Gross per 24 hour   Intake             1913 ml   Output                0 ml   Net             1913 ml            • levothyroxine  75 mcg AM ES   • artificial tears  1 Drop PRN   • LORazepam  0.5 mg Once PRN   • senna-docusate  2 Tab BID    And   • polyethylene glycol/lytes  1 Packet QDAY PRN    And   • magnesium hydroxide  30 mL QDAY PRN    And   • bisacodyl  10 mg QDAY PRN   • Respiratory Care per Protocol   Continuous RT   • acetaminophen  650 mg Q6HRS PRN   • LORazepam  1 mg Q HOUR PRN   •  glucose 4 g  16 g Q15 MIN PRN    And   • dextrose 50%  25 mL Q15 MIN PRN       Assessment and Plan:  Hospital day #2 Meningioma  Prophylactic anticoagulation: yes             Plan:  Seen by Dr Lyman, patient declines surgery. Requests DNR.DNI per nursing. Residents at bedside, Dr Lyman dw Residents.

## 2018-11-02 NOTE — ASSESSMENT & PLAN NOTE
- S/P resectional surgery x 3  - currently no GI complaints  - no surveillance indicated at present

## 2018-11-02 NOTE — CARE PLAN
Problem: Safety  Goal: Will remain free from injury  Outcome: PROGRESSING AS EXPECTED  Fall precautions in place. Bed alarm on. Bed in low position.

## 2018-11-02 NOTE — PROGRESS NOTES
CT reviewed.  NO ACUTE PATHOLOGY.  No need for pt to be kept NPO.  If pt is a candidate for craniotomy, needs MRI brain s/c Gd.

## 2018-11-02 NOTE — ASSESSMENT & PLAN NOTE
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

## 2018-11-02 NOTE — ASSESSMENT & PLAN NOTE
- pt asymptomatic  - no pacemaker in situ  - likely secondary to chronic MR  - currently in sinus rhythm  - CHADSVASC of 3  - Echo: significant gradient across mitral valve concern for MS per Cardiology - rec Warfarin and MATI as outpatient  - currently in sinus rthythm    Plan  - Warfarin to continue, dose adjustment needed per INR  - metoprolol 37.5 BID

## 2018-11-02 NOTE — ASSESSMENT & PLAN NOTE
- no sequelae from fall, no dizziness  - no hx of instability on feet or prior falls  - risks of not anticoagulating greater than risks of bleed from potential future falls    Plan  - aspiration, seizure and fall precautions  - Warfarin

## 2018-11-02 NOTE — CONSULTS
DATE OF SERVICE:  11/02/2018    NEUROSURGICAL CONSULTATION    REFERRING PHYSICIAN:  [facility] internal medicine.    HISTORY OF PRESENT ILLNESS:  An 84-year-old woman with known meningioma of   right middle cranial fossa apparently treated with radiation by my partner,   Dr. Lucero, in the past who is now retired from clinical practice.  She   apparently had a fall, could not get up, was lying down for extended period of   time.  I was consulted because of CT scan findings, which show a large middle   cranial fossa meningioma.  There is some edema.  Currently, the patient   denies any significant headaches.  She has got multiple medical problems.    REVIEW OF SYSTEMS:  No history of bleeding disorder.  She has been living   independently.  She is not on any anticoagulants.    PHYSICAL EXAMINATION:  VITAL SIGNS:  Recorded.  NEUROLOGIC:  The patient is awake, alert.  She is deaf.  She has got a right   seventh nerve palsy.  She communicates by her writing.  She generates good   power in upper and lower extremities.  Pupils are equally round and reactive   to light.  Gait is not tested.    IMPRESSION:  Large right middle cranial fossa meningioma. I asked her if she would want   surgery to have that taken out.  She tells me years ago they talked about   removing it, but felt it was too risky.  At this point in time, I do not see   an absolute need for anything to be done unless she were having severe   headaches or focal neurologic deficit namely hemiparesis.  In the absence of   that, could leave well enough alone.  Surgery in this patient would be high   risk and fraught with potential complications.  If her symptoms worsen, she   has our card and number, can contact me.       ____________________________________     MD LIYAH VASQUEZ / NATALYA    DD:  11/02/2018 13:30:42  DT:  11/02/2018 16:00:54    D#:  [id number]  Job#:  [id number]

## 2018-11-02 NOTE — THERAPY
"Speech Language Therapy Clinical Swallow Evaluation completed.    Functional Status: Patient very [place] therefore this SLP utilized written communication. She reported some difficulty with water at baseline and stated, \"I need to drink very slow.\" Oral motor examination revealed right facial droop (baseline per EMR), lingual deviation to left with elevation, and reduced labial strength for adequate lip seal. PO trials were administered and consisted of ice chips, NTL, puree, thin liquids, and soft solids. Swallow trigger min-moderately delayed and laryngeal elevation weak to palpation. With trials of thin liquids, patient had cough x2, throat clear x2, c/o globus sensation at the level of the sternum, and required 2 swallows to clear bolus (?query esophageal component). Patient stated, \"Oh, I did it again. I drink too fast.\" She consumed subsequent trials of thin liquids via small single sips which resulted in no overt difficulty. Patient consumed NTL, puree, and soft solids with no overt s/sx of aspiration. Trace bolus loss to left noted with puree.     Recommendations - Diet: At this time, patient is presenting with s/sx consistent with min-moderate oropharyngeal dysphagia and would recommend patient initiate a D2/NTL to minimize risk of aspiration. No current CXR on file - consider placing orders. Spoke with MD regarding status, POC, and SLP recs.                             Strategies: Monitor during meals, Assistance needed for meal tray set-up and Head of Bed at 90 Degrees                            Medication Administration: Float whole in puree    Plan of Care: Will benefit from Speech Therapy 5 times per week    Post-Acute Therapy: Discharge to a transitional care facility for continued skilled therapy services.    See \"Rehab Therapy-Acute\" Patient Summary Report for complete documentation.           "

## 2018-11-02 NOTE — ASSESSMENT & PLAN NOTE
- 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, states secondary to radiation  - pt unable to wrinkle forehead

## 2018-11-02 NOTE — ASSESSMENT & PLAN NOTE
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

## 2018-11-02 NOTE — PROGRESS NOTES
This RN arrived to neuro unit. Pt attached to ZOLL monitor. Shows a-fib RVR. VS assessed. /73, , otherwise stable. Pt denies chest pain; only lower back. Medicated per MAR for pain. Assessment completed. Pt sitting comfortably up in bed eating lunch. This RN stays sitting with pt in S185-2 waiting for room T733-2 to be cleaned. Charge RN Mindee to notify this RN of when tele room is clean.

## 2018-11-02 NOTE — PROGRESS NOTES
Pt transferred with this RN to T733-2 on zoll. VSS stable, .  Tele monitor applied. Pt oriented to tele and plan of care discussed unit using white board for communication. A/Ox4, Call light in reach. Bed locked in lowest position with 2 upper bed rails up. Bed alarm on.

## 2018-11-02 NOTE — PROGRESS NOTES
Patient admitted to Neuroscience floor at 1830. Bedside report given to Lavon WAGNER and he will do patient'd admission. A&ox1 to self. Vitals stable. No complaints of pain or discomfort. Safety precautions initiated. Reviewing orders at this time and initiating them.

## 2018-11-02 NOTE — PROGRESS NOTES
Monitor Summary: SR-ST , MO .14, QRS .08, QT .36 with occasional PVC, 10 sec PSVT with HR up to 169 per strip from monitor room

## 2018-11-03 ENCOUNTER — APPOINTMENT (OUTPATIENT)
Dept: CARDIOLOGY | Facility: MEDICAL CENTER | Age: 83
DRG: 054 | End: 2018-11-03
Attending: STUDENT IN AN ORGANIZED HEALTH CARE EDUCATION/TRAINING PROGRAM
Payer: MEDICARE

## 2018-11-03 PROBLEM — I48.0 PAROXYSMAL ATRIAL FIBRILLATION (HCC): Status: ACTIVE | Noted: 2018-11-01

## 2018-11-03 PROBLEM — E80.6 HYPERBILIRUBINEMIA: Status: RESOLVED | Noted: 2018-11-01 | Resolved: 2018-11-03

## 2018-11-03 LAB
ALBUMIN SERPL BCP-MCNC: 3.5 G/DL (ref 3.2–4.9)
ALBUMIN/GLOB SERPL: 1.9 G/DL
ALP SERPL-CCNC: 50 U/L (ref 30–99)
ALT SERPL-CCNC: 12 U/L (ref 2–50)
ANION GAP SERPL CALC-SCNC: 6 MMOL/L (ref 0–11.9)
AST SERPL-CCNC: 18 U/L (ref 12–45)
BASOPHILS # BLD AUTO: 0.2 % (ref 0–1.8)
BASOPHILS # BLD: 0.02 K/UL (ref 0–0.12)
BILIRUB SERPL-MCNC: 0.8 MG/DL (ref 0.1–1.5)
BUN SERPL-MCNC: 44 MG/DL (ref 8–22)
CALCIUM SERPL-MCNC: 9.3 MG/DL (ref 8.5–10.5)
CHLORIDE SERPL-SCNC: 110 MMOL/L (ref 96–112)
CK SERPL-CCNC: 103 U/L (ref 0–154)
CO2 SERPL-SCNC: 25 MMOL/L (ref 20–33)
CREAT SERPL-MCNC: 0.89 MG/DL (ref 0.5–1.4)
EOSINOPHIL # BLD AUTO: 0.04 K/UL (ref 0–0.51)
EOSINOPHIL NFR BLD: 0.5 % (ref 0–6.9)
ERYTHROCYTE [DISTWIDTH] IN BLOOD BY AUTOMATED COUNT: 43.3 FL (ref 35.9–50)
GLOBULIN SER CALC-MCNC: 1.8 G/DL (ref 1.9–3.5)
GLUCOSE SERPL-MCNC: 101 MG/DL (ref 65–99)
HCT VFR BLD AUTO: 38.3 % (ref 37–47)
HGB BLD-MCNC: 12.8 G/DL (ref 12–16)
IMM GRANULOCYTES # BLD AUTO: 0.03 K/UL (ref 0–0.11)
IMM GRANULOCYTES NFR BLD AUTO: 0.4 % (ref 0–0.9)
LV EJECT FRACT  99904: 45
LV EJECT FRACT MOD 2C 99903: 33.24
LV EJECT FRACT MOD 4C 99902: 32.6
LV EJECT FRACT MOD BP 99901: 37.35
LYMPHOCYTES # BLD AUTO: 1.06 K/UL (ref 1–4.8)
LYMPHOCYTES NFR BLD: 13.2 % (ref 22–41)
MAGNESIUM SERPL-MCNC: 1.8 MG/DL (ref 1.5–2.5)
MCH RBC QN AUTO: 29.6 PG (ref 27–33)
MCHC RBC AUTO-ENTMCNC: 33.4 G/DL (ref 33.6–35)
MCV RBC AUTO: 88.7 FL (ref 81.4–97.8)
MONOCYTES # BLD AUTO: 0.71 K/UL (ref 0–0.85)
MONOCYTES NFR BLD AUTO: 8.8 % (ref 0–13.4)
NEUTROPHILS # BLD AUTO: 6.17 K/UL (ref 2–7.15)
NEUTROPHILS NFR BLD: 76.9 % (ref 44–72)
NRBC # BLD AUTO: 0 K/UL
NRBC BLD-RTO: 0 /100 WBC
PLATELET # BLD AUTO: 184 K/UL (ref 164–446)
PMV BLD AUTO: 10 FL (ref 9–12.9)
POTASSIUM SERPL-SCNC: 4.1 MMOL/L (ref 3.6–5.5)
PROT SERPL-MCNC: 5.3 G/DL (ref 6–8.2)
RBC # BLD AUTO: 4.32 M/UL (ref 4.2–5.4)
SODIUM SERPL-SCNC: 141 MMOL/L (ref 135–145)
WBC # BLD AUTO: 8 K/UL (ref 4.8–10.8)

## 2018-11-03 PROCEDURE — 700102 HCHG RX REV CODE 250 W/ 637 OVERRIDE(OP): Performed by: STUDENT IN AN ORGANIZED HEALTH CARE EDUCATION/TRAINING PROGRAM

## 2018-11-03 PROCEDURE — 93306 TTE W/DOPPLER COMPLETE: CPT | Mod: 26 | Performed by: INTERNAL MEDICINE

## 2018-11-03 PROCEDURE — 93306 TTE W/DOPPLER COMPLETE: CPT

## 2018-11-03 PROCEDURE — 770020 HCHG ROOM/CARE - TELE (206)

## 2018-11-03 PROCEDURE — A9270 NON-COVERED ITEM OR SERVICE: HCPCS | Performed by: STUDENT IN AN ORGANIZED HEALTH CARE EDUCATION/TRAINING PROGRAM

## 2018-11-03 PROCEDURE — 99232 SBSQ HOSP IP/OBS MODERATE 35: CPT | Mod: GC | Performed by: STUDENT IN AN ORGANIZED HEALTH CARE EDUCATION/TRAINING PROGRAM

## 2018-11-03 PROCEDURE — 36415 COLL VENOUS BLD VENIPUNCTURE: CPT

## 2018-11-03 PROCEDURE — 80053 COMPREHEN METABOLIC PANEL: CPT

## 2018-11-03 PROCEDURE — 85025 COMPLETE CBC W/AUTO DIFF WBC: CPT

## 2018-11-03 PROCEDURE — 82550 ASSAY OF CK (CPK): CPT

## 2018-11-03 PROCEDURE — 83735 ASSAY OF MAGNESIUM: CPT

## 2018-11-03 RX ADMIN — ACETAMINOPHEN 650 MG: 325 TABLET, FILM COATED ORAL at 12:34

## 2018-11-03 RX ADMIN — ACETAMINOPHEN 650 MG: 325 TABLET, FILM COATED ORAL at 05:19

## 2018-11-03 RX ADMIN — POLYVINYL ALCOHOL 1 DROP: 14 SOLUTION/ DROPS OPHTHALMIC at 05:18

## 2018-11-03 RX ADMIN — ACETAMINOPHEN 650 MG: 325 TABLET, FILM COATED ORAL at 23:09

## 2018-11-03 RX ADMIN — LEVOTHYROXINE SODIUM 75 MCG: 75 TABLET ORAL at 05:19

## 2018-11-03 RX ADMIN — METOPROLOL TARTRATE 12.5 MG: 25 TABLET, FILM COATED ORAL at 11:09

## 2018-11-03 RX ADMIN — POLYVINYL ALCOHOL 1 DROP: 14 SOLUTION/ DROPS OPHTHALMIC at 17:14

## 2018-11-03 RX ADMIN — ASPIRIN 81 MG: 81 TABLET, CHEWABLE ORAL at 05:19

## 2018-11-03 RX ADMIN — METOPROLOL TARTRATE 12.5 MG: 25 TABLET, FILM COATED ORAL at 17:13

## 2018-11-03 ASSESSMENT — PAIN SCALES - GENERAL
PAINLEVEL_OUTOF10: 5
PAINLEVEL_OUTOF10: 5
PAINLEVEL_OUTOF10: 8
PAINLEVEL_OUTOF10: 2
PAINLEVEL_OUTOF10: 0
PAINLEVEL_OUTOF10: 2

## 2018-11-03 ASSESSMENT — ENCOUNTER SYMPTOMS
FEVER: 0
BRUISES/BLEEDS EASILY: 0
SORE THROAT: 0
EYE PAIN: 0
MYALGIAS: 0
VOMITING: 0
LOSS OF CONSCIOUSNESS: 0
COUGH: 0
BACK PAIN: 0
SPUTUM PRODUCTION: 0
SPEECH CHANGE: 0
NERVOUS/ANXIOUS: 0
DIZZINESS: 0
CHILLS: 0
PALPITATIONS: 0
ABDOMINAL PAIN: 0
SENSORY CHANGE: 0
DEPRESSION: 0
TREMORS: 0
EYE REDNESS: 0
HALLUCINATIONS: 0
FOCAL WEAKNESS: 0
INSOMNIA: 0
WEIGHT LOSS: 0
TINGLING: 0
SEIZURES: 0
EYE DISCHARGE: 1
ORTHOPNEA: 0
NAUSEA: 0
SHORTNESS OF BREATH: 0
HEADACHES: 0
CONSTIPATION: 0
DIARRHEA: 0

## 2018-11-03 ASSESSMENT — LIFESTYLE VARIABLES: SUBSTANCE_ABUSE: 0

## 2018-11-03 NOTE — PROGRESS NOTES
Internal Medicine Interval Note  Note Author: Amauri Jasso M.D.     Name Cris Pham 1934   Age/Sex 84 y.o. female   MRN [id number]   Code Status DNAR/DNI     After 5PM or if no immediate response to page, please call for cross-coverage  Attending/Team: Dr Palm/ Salena See Patient List for primary contact information  Call [phone] to page    1st Call - Day Intern (R1):   Dr Moctezuma 2nd Call - Day Sr. Resident (R2/R3):   Dr Jasso         Reason for interval visit  (Principal Problem)   Atrial fibrillation  Meningioma  B/l hearing loss  GLF    Interval Problem Daily Status Update  (24 hours, problem oriented, brief subjective history, new lab/imaging data pertinent to that problem)     - no significant acute events overnight  -Patient was SR but slightly tachy 90-110s overnight with 4 beats Vtach  -Metoprolol 12.5mg BID started  -Patient hesitantly okay with idea of starting Warfarin and has taken it in the past following her valve replacement. She says she was on it for a few years, but unsure why she was taken off.  -echo done, read pending   -patient unhappy about the idea of SNF, SLP recommend SNF and PT recommends SNF or group home placement     - Josh De Leon (pt's landlord/ caregiver) - [phone] for further correspondence - he will visit pt on       Review of Systems   Constitutional: Negative for chills, fever and weight loss.   HENT: Positive for hearing loss. Negative for sore throat.    Eyes: Positive for discharge. Negative for pain and redness.        Blindness in right eye  Mucus discharge from right eye   Respiratory: Negative for cough, sputum production and shortness of breath.    Cardiovascular: Negative for chest pain, palpitations, orthopnea and leg swelling.   Gastrointestinal: Negative for abdominal pain, constipation, diarrhea, nausea and vomiting.   Genitourinary: Negative for dysuria, frequency and urgency.   Musculoskeletal: Negative for back pain, joint pain  and myalgias.        Pain in buttocks region   Skin: Negative for itching and rash.   Neurological: Negative for dizziness, tingling, tremors, sensory change, speech change, focal weakness, seizures, loss of consciousness and headaches.   Endo/Heme/Allergies: Negative for environmental allergies. Does not bruise/bleed easily.   Psychiatric/Behavioral: Negative for depression, hallucinations, substance abuse and suicidal ideas. The patient is not nervous/anxious and does not have insomnia.        Disposition/Barriers to discharge:   Likely rehab as some instability and concern for immediate independence    Consultants/Specialty  Dr Esmer KIRKLAND - Neurosurgery  PCP: Pcp Unobtainable By       Quality Measures  Quality-Core Measures   Reviewed items::  Labs reviewed, Medications reviewed and Radiology images reviewed  Paul catheter::  No Paul  DVT prophylaxis pharmacological::  Enoxaparin (Lovenox)  DVT prophylaxis - mechanical:  SCDs  Ulcer Prophylaxis::  No          Physical Exam       Vitals:    11/03/18 0300 11/03/18 0800 11/03/18 1109 11/03/18 1231   BP: 118/73 125/73 119/72 121/71   Pulse: 97 98 (!) 106 (!) 107   Resp: 16 16  16   Temp: 36.4 °C (97.5 °F) 36.4 °C (97.6 °F)  36.6 °C (97.8 °F)   SpO2: 91% 93%  96%   Weight:       Height:         Body mass index is 25.31 kg/m². Weight: 69 kg (152 lb 1.9 oz)  Oxygen Therapy:  Pulse Oximetry: 96 %, O2 (LPM): 0, O2 Delivery: None (Room Air)    Physical Exam   Constitutional: She is oriented to person, place, and time and well-developed, well-nourished, and in no distress. No distress.   HENT:   Head: Normocephalic and atraumatic.   Nose: Nose normal.   Mouth/Throat: No oropharyngeal exudate.   Eyes: Conjunctivae and EOM are normal. Right eye exhibits discharge. Left eye exhibits no discharge.   Right sided ptosis   Neck: Normal range of motion. Neck supple.   Cardiovascular: Normal rate, regular rhythm and normal heart sounds.  Exam reveals no friction rub.    No  murmur heard.  Pulmonary/Chest: Effort normal. No respiratory distress. She has no wheezes. She has no rales.   Abdominal: Soft. She exhibits no distension. There is no tenderness. There is no guarding.   Genitourinary:   Genitourinary Comments: declined   Musculoskeletal: She exhibits no edema or deformity.   Neurological: She is alert and oriented to person, place, and time.   Skin: Skin is warm and dry. No rash noted. She is not diaphoretic. No erythema.   Psychiatric: Mood and affect normal.   Nursing note and vitals reviewed.            Assessment/Plan     * Fall from ground level   Assessment & Plan    - pt fell and injured buttocks, did not injure head or lose consciousness per hx  - was unable to move, alerted neighbors  - likely instability causing fall    Plan  - telemetry  - aspiration, seizure and fall precautions          Paroxysmal atrial fibrillation (HCC)   Assessment & Plan    - no pacemaker in situ  - likely secondary to chronic MR  - episode of A fib with RVR which converted to sinus tachy a few hours later  - metoprolol 12.5 BID  -CHADSVASC of 3    Plan  - Telemetry  - Echo read pending   -discuss warfarin initiation with patient again, currently hesitant to initiate, but not outright against it        Meningioma (HCC)   Assessment & Plan    - diagnosed 2012, managed conservatively and with radiation  - 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 bone  - 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, no current tinnitus  - recent fall/ LOC, possible expansion of mass-   - erosion of petrous bone, midline shift and vasogenic edema, rapid increase in size of lesion  - pt refuses treatment of meningioma, Neurosurgery recommends discontinuation of Keppra and Decadron as no evidence of raised ICP    -if acute changes in mentation or neurological status can re consult neurosurgery          Hypothyroidism   Assessment & Plan    - TSH 0.65, free T4 1.19, WNL  - continue home dose of Synthroid 75 mcg        History of colon cancer   Assessment &  Plan    - S/P resectional surgery x 3  - currently no GI compliants  - CTM  - consider FOBT        Blind right eye   Assessment & Plan    - unclear etiology per caregiver  - complete loss of vision  - left eye retains vision but has cataract per hx    Plan  - fall precautions  - CTM        Bell palsy   Assessment & Plan    - 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

## 2018-11-03 NOTE — PROGRESS NOTES
2 RN skin check completed with KRISTY Farrell. Elbows red but blanching. Left elbow has small healing scab. Heels are red but blanching. Sacrum red but blanching. Skin is otherwise clean, dry, and intact.

## 2018-11-03 NOTE — CARE PLAN
Problem: Safety  Goal: Will remain free from falls  Outcome: PROGRESSING AS EXPECTED  Pt educated to use call light before getting out of bed. Call light in reach. Bed locked in lowest position with 2 upper bed rails up. Pt encouraged to sit at edge of bed before standing up. No c/o dizziness. Assistance of one using the walker given to help pt to the commode and back to bed. Room floor is free from clutter. Treaded socks on pt. Bed alarm on.     Problem: Knowledge Deficit  Goal: Knowledge of disease process/condition, treatment plan, diagnostic tests, and medications will improve    Intervention: Explain information regarding disease process/condition, treatment plan, diagnostic tests, and medications and document in education  Pt educated on plan of care, medications, pain management, and disease processes. Pt verbalized understanding and was encouraged to ask questions. All questions answered.

## 2018-11-03 NOTE — PROGRESS NOTES
Spoke with patient's contact Josh De Leon. He was update per patient permission. He was aware of most of the situation. He is a home health nurse at [facility] and is very involved with her care and has been for about 5 years he said. He would like her to be discharged home with home health again to pick her up as a pt again. The pt would like to go home as well and really is not wanting to go to SNF per [facility] yellow request. Social work consult placed. [employer] updated.

## 2018-11-04 LAB
ANION GAP SERPL CALC-SCNC: 7 MMOL/L (ref 0–11.9)
BUN SERPL-MCNC: 28 MG/DL (ref 8–22)
CALCIUM SERPL-MCNC: 9.4 MG/DL (ref 8.5–10.5)
CHLORIDE SERPL-SCNC: 109 MMOL/L (ref 96–112)
CO2 SERPL-SCNC: 26 MMOL/L (ref 20–33)
CREAT SERPL-MCNC: 0.8 MG/DL (ref 0.5–1.4)
GLUCOSE SERPL-MCNC: 98 MG/DL (ref 65–99)
POTASSIUM SERPL-SCNC: 3.9 MMOL/L (ref 3.6–5.5)
SODIUM SERPL-SCNC: 142 MMOL/L (ref 135–145)

## 2018-11-04 PROCEDURE — 700102 HCHG RX REV CODE 250 W/ 637 OVERRIDE(OP): Performed by: STUDENT IN AN ORGANIZED HEALTH CARE EDUCATION/TRAINING PROGRAM

## 2018-11-04 PROCEDURE — 99232 SBSQ HOSP IP/OBS MODERATE 35: CPT | Mod: GC | Performed by: STUDENT IN AN ORGANIZED HEALTH CARE EDUCATION/TRAINING PROGRAM

## 2018-11-04 PROCEDURE — 770020 HCHG ROOM/CARE - TELE (206)

## 2018-11-04 PROCEDURE — A9270 NON-COVERED ITEM OR SERVICE: HCPCS | Performed by: STUDENT IN AN ORGANIZED HEALTH CARE EDUCATION/TRAINING PROGRAM

## 2018-11-04 PROCEDURE — 99222 1ST HOSP IP/OBS MODERATE 55: CPT | Performed by: INTERNAL MEDICINE

## 2018-11-04 PROCEDURE — 36415 COLL VENOUS BLD VENIPUNCTURE: CPT

## 2018-11-04 PROCEDURE — 80048 BASIC METABOLIC PNL TOTAL CA: CPT

## 2018-11-04 PROCEDURE — 700111 HCHG RX REV CODE 636 W/ 250 OVERRIDE (IP): Performed by: STUDENT IN AN ORGANIZED HEALTH CARE EDUCATION/TRAINING PROGRAM

## 2018-11-04 RX ORDER — TRAMADOL HYDROCHLORIDE 50 MG/1
50 TABLET ORAL EVERY 6 HOURS PRN
Status: DISCONTINUED | OUTPATIENT
Start: 2018-11-04 | End: 2018-11-08 | Stop reason: HOSPADM

## 2018-11-04 RX ADMIN — ASPIRIN 81 MG: 81 TABLET, CHEWABLE ORAL at 05:15

## 2018-11-04 RX ADMIN — LEVOTHYROXINE SODIUM 75 MCG: 75 TABLET ORAL at 05:14

## 2018-11-04 RX ADMIN — METOPROLOL TARTRATE 25 MG: 25 TABLET, FILM COATED ORAL at 17:19

## 2018-11-04 RX ADMIN — ACETAMINOPHEN 650 MG: 325 TABLET, FILM COATED ORAL at 05:15

## 2018-11-04 RX ADMIN — ACETAMINOPHEN 650 MG: 325 TABLET, FILM COATED ORAL at 14:01

## 2018-11-04 RX ADMIN — POLYVINYL ALCOHOL 1 DROP: 14 SOLUTION/ DROPS OPHTHALMIC at 17:19

## 2018-11-04 RX ADMIN — STANDARDIZED SENNA CONCENTRATE AND DOCUSATE SODIUM 2 TABLET: 8.6; 5 TABLET, FILM COATED ORAL at 05:15

## 2018-11-04 RX ADMIN — TRAMADOL HYDROCHLORIDE 50 MG: 50 TABLET, FILM COATED ORAL at 17:19

## 2018-11-04 RX ADMIN — POLYVINYL ALCOHOL 1 DROP: 14 SOLUTION/ DROPS OPHTHALMIC at 05:24

## 2018-11-04 RX ADMIN — TRAMADOL HYDROCHLORIDE 50 MG: 50 TABLET, FILM COATED ORAL at 09:37

## 2018-11-04 RX ADMIN — ENOXAPARIN SODIUM 40 MG: 100 INJECTION SUBCUTANEOUS at 05:15

## 2018-11-04 RX ADMIN — METOPROLOL TARTRATE 12.5 MG: 25 TABLET, FILM COATED ORAL at 05:15

## 2018-11-04 ASSESSMENT — ENCOUNTER SYMPTOMS
LOSS OF CONSCIOUSNESS: 0
FEVER: 0
TREMORS: 0
FOCAL WEAKNESS: 0
SPEECH CHANGE: 0
DEPRESSION: 0
ORTHOPNEA: 0
SHORTNESS OF BREATH: 0
SPUTUM PRODUCTION: 0
DOUBLE VISION: 0
SINUS PAIN: 0
HEADACHES: 0
MYALGIAS: 0
SEIZURES: 0
NAUSEA: 0
EYE DISCHARGE: 0
EYE REDNESS: 0
CHILLS: 0
VOMITING: 0
BRUISES/BLEEDS EASILY: 0
SENSORY CHANGE: 0
PALPITATIONS: 0
EYE PAIN: 0
CONSTIPATION: 0
NERVOUS/ANXIOUS: 0
DIZZINESS: 0
TINGLING: 0
BACK PAIN: 1
DIARRHEA: 0
WEIGHT LOSS: 0
HALLUCINATIONS: 0
SORE THROAT: 0
INSOMNIA: 0
COUGH: 0
ABDOMINAL PAIN: 0

## 2018-11-04 ASSESSMENT — PAIN SCALES - GENERAL
PAINLEVEL_OUTOF10: 2
PAINLEVEL_OUTOF10: 8
PAINLEVEL_OUTOF10: 7
PAINLEVEL_OUTOF10: 8
PAINLEVEL_OUTOF10: 9
PAINLEVEL_OUTOF10: 5
PAINLEVEL_OUTOF10: 0

## 2018-11-04 ASSESSMENT — LIFESTYLE VARIABLES: SUBSTANCE_ABUSE: 0

## 2018-11-04 NOTE — PROGRESS NOTES
Bedside shift report received. Assumed care of patient at this time. Patient sitting in chair at bedside. Personal items and call light within reach. No further requests at this time.

## 2018-11-04 NOTE — PROGRESS NOTES
Internal Medicine Interval Note  Note Author: Catarino Moctezuma M.D.     Name Cris Pham 1934   Age/Sex 84 y.o. female   MRN [id number]   Code Status DNAR/DNI     After 5PM or if no immediate response to page, please call for cross-coverage  Attending/Team: Dr Palm/ Salena See Patient List for primary contact information  Call [phone] to page    1st Call - Day Intern (R1):   Dr Moctezuma 2nd Call - Day Sr. Resident (R2/R3):   Dr Jasso         Reason for interval visit  (Principal Problem)   Atrial fibrillation  Meningioma  B/l hearing loss  GLF    Interval Problem Daily Status Update  (24 hours, problem oriented, brief subjective history, new lab/imaging data pertinent to that problem)     - Pt seen and examined at bedside this AM, stable and NAD. No events on tele overnight    - No headache, blurred vision, tinnitus    - Metoprolol increased to 25 BID in view of tachycardia and hypertension    - Patient hesitantly okay with idea of starting Warfarin and has taken it in the past following her valve replacement. She says she was on it for a few years, but unsure why she was taken off.    - Echo: significant gradient across mitral valve (13 mmHg) - Cardiology recs outpatient MATI, MS possible - rec Warfarin as risk for CVA remains high    - patient reluctantly agrees to SNF, SLP recommend SNF and PT recommends SNF or group home placement - PT to re-consult regarding placement recs     - Josh De Leon (pt's landlord/ caregiver) - [phone] for further correspondence        Review of Systems   Constitutional: Negative for chills, fever and weight loss.   HENT: Positive for hearing loss. Negative for congestion, sinus pain and sore throat.    Eyes: Negative for double vision, pain, discharge and redness.        Blindness in right eye     Respiratory: Negative for cough, sputum production and shortness of breath.    Cardiovascular: Negative for chest pain, palpitations, orthopnea and leg  swelling.   Gastrointestinal: Negative for abdominal pain, constipation, diarrhea, nausea and vomiting.   Genitourinary: Negative for dysuria, frequency and urgency.   Musculoskeletal: Positive for back pain. Negative for joint pain and myalgias.        Pain in right shoulder and upper back   Skin: Negative for itching and rash.   Neurological: Negative for dizziness, tingling, tremors, sensory change, speech change, focal weakness, seizures, loss of consciousness and headaches.   Endo/Heme/Allergies: Negative for environmental allergies. Does not bruise/bleed easily.   Psychiatric/Behavioral: Negative for depression, hallucinations, substance abuse and suicidal ideas. The patient is not nervous/anxious and does not have insomnia.        Disposition/Barriers to discharge:   Likely SNF/ group home as some instability and concern for immediate independence; [facility]    Consultants/Specialty  Dr Esmer KIRKLAND - Neurosurgery  PCP: Pcp Unobtainable By       Quality Measures  Quality-Core Measures   Reviewed items::  Labs reviewed, Medications reviewed and Radiology images reviewed  Paul catheter::  No Paul  DVT prophylaxis pharmacological::  Enoxaparin (Lovenox)  DVT prophylaxis - mechanical:  SCDs  Ulcer Prophylaxis::  No          Physical Exam       Vitals:    11/04/18 0000 11/04/18 0318 11/04/18 0415 11/04/18 0730   BP: 150/100  (!) 175/102 160/101   Pulse: 98  100 97   Resp: 18  17 (!) 23   Temp: 36.4 °C (97.6 °F)  36.1 °C (97 °F) 36.7 °C (98 °F)   SpO2: 94%  94% 94%   Weight:  69.8 kg (153 lb 14.1 oz)     Height:         Body mass index is 25.61 kg/m². Weight: 69.8 kg (153 lb 14.1 oz)  Oxygen Therapy:  Pulse Oximetry: 94 %, O2 (LPM): 0, O2 Delivery: None (Room Air)    Physical Exam   Constitutional: She is oriented to person, place, and time and well-developed, well-nourished, and in no distress. No distress.   HENT:   Head: Normocephalic and atraumatic.   Nose: Nose normal.    Mouth/Throat: No oropharyngeal exudate.   Eyes: Pupils are equal, round, and reactive to light. Conjunctivae and EOM are normal. Right eye exhibits no discharge. Left eye exhibits no discharge.   Right sided ptosis   Neck: Normal range of motion. Neck supple.   Cardiovascular: Normal rate, regular rhythm and normal heart sounds.  Exam reveals no friction rub.    No murmur heard.  Pulmonary/Chest: Effort normal. No respiratory distress. She has no wheezes. She has no rales.   Abdominal: Soft. She exhibits no distension. There is no tenderness. There is no guarding.   Genitourinary:   Genitourinary Comments: declined   Musculoskeletal: She exhibits no edema or deformity.   Neurological: She is alert and oriented to person, place, and time.   Skin: Skin is warm and dry. No rash noted. She is not diaphoretic. No erythema.   Psychiatric: Mood and affect normal.   Nursing note and vitals reviewed.            Assessment/Plan     * Fall from ground level   Assessment & Plan    - pt fell and injured buttocks, did not injure head or lose consciousness per hx  - was unable to move, alerted neighbors  - likely instability causing fall    Plan  - telemetry  - aspiration, seizure and fall precautions       Paroxysmal atrial fibrillation (HCC)   Assessment & Plan    - no pacemaker in situ  - likely secondary to chronic MR  - episode of A fib with RVR which converted to sinus tachy a few hours later  - metoprolol 25 BID in view of hypertension and tachycardia  -CHADSVASC of 3  -Echo: significant gradient across mitral valve concern for MS per Cardiology - rec Warfarin and MATI as outpatient    Plan  - Telemetry  - consider Warfarin     Meningioma (HCC)   Assessment & Plan    - diagnosed 2012, managed conservatively and with radiation  - 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 bone  - 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, no current tinnitus  - recent fall/ LOC, possible expansion of mass-   - erosion of petrous bone, midline shift and vasogenic edema, rapid  increase in size of lesion  - pt refuses treatment of meningioma, Neurosurgery recommends discontinuation of Keppra and Decadron as no evidence of raised ICP    -if acute changes in mentation or neurological status can re consult neurosurgery       Hypothyroidism   Assessment & Plan    - TSH 0.65, free T4 1.19, WNL  - continue home dose of Synthroid 75 mcg     History of colon cancer   Assessment & Plan    - S/P resectional surgery x 3  - currently no GI compliants  - CTM  - consider FOBT     Blind right eye   Assessment & Plan    - unclear etiology per caregiver  - complete loss of vision  - left eye retains vision but has cataract per hx    Plan  - fall precautions  - CTM     Bell palsy   Assessment & Plan    - 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

## 2018-11-04 NOTE — CARE PLAN
Problem: Knowledge Deficit  Goal: Knowledge of disease process/condition, treatment plan, diagnostic tests, and medications will improve    Intervention: Explain information regarding disease process/condition, treatment plan, diagnostic tests, and medications and document in education  Pt educated on plan of care, medications, pain management, and disease processes. Pt verbalized understanding and was encouraged to ask questions. All questions answered.         Problem: Pain Management  Goal: Pain level will decrease to patient's comfort goal  Outcome: PROGRESSING AS EXPECTED  Pt pain assessed using pain scale and descriptors. Medicated per MAR. Pt complaining of additional pain. Warm blanket and warm pack provided.

## 2018-11-04 NOTE — CONSULTS
Cardiology Consult Note    Date of note:    11/4/2018      Patient ID:    Name:             Cris Pham     YOB: 1934  Age:                 84 y.o.  female   MRN:               [id number]                                                             Consulting Physician: Don Palm MD    Consult question: Evaluation of possible prosthetic valve stenosis    History of Present Illness:      Cris Pham is an 84-year-old woman with history of paroxysmal atrial fibrillation, mitral regurgitation status post mitral valve repair (unclear details around her mitral valve repair to me at the time of this note), and previous stroke who presents with a fall.  Transthoracic echocardiogram was read as borderline severe mitral stenosis, and cardiology was consulted regarding the possibility of a transesophageal echocardiogram.    The patient is blind in her right eye, and hard of hearing after several attempts at communication including with writing on paper and calling her friend and caregiver no reliable history was obtained.  She denied any cardiovascular symptoms, and related her fall.  She did tell me that she previously was on warfarin, and confirmed her history of stroke.    Review of Systems: As much as obtainable, all others reviewed and negative.    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  •  tramadol (ULTRAM) 50 MG tablet 50 mg, 50 mg, Oral, Q6HRS PRN, Ravindra Palm M.D., 50 mg at 11/04/18 0937  •  enoxaparin (LOVENOX) inj 40 mg, 40 mg, Subcutaneous, DAILY, Amauri Jasso M.D., 40 mg at 11/04/18 0515  •  levothyroxine (SYNTHROID) tablet 75 mcg, 75 mcg, Oral, AM ES, Amauri Jasso M.D., 75 mcg at 11/04/18 0514  •  aspirin (ASA) chewable tab 81 mg, 81 mg, Oral, DAILY, Catarino Moctezuma M.D., 81 mg at 11/04/18 0515  •  artificial tears 1.4 % ophthalmic solution 1 Drop, 1 Drop, Right Eye, BID, Amauri Jasso M.D., 1 Drop at 11/04/18 0524  •  senna-docusate (PERICOLACE or SENOKOT S) 8.6-50 MG per tablet 2 Tab, 2 Tab, Oral, BID, 2 Tab at 11/04/18 0515 **AND** polyethylene glycol/lytes (MIRALAX) PACKET 1 Packet, 1 Packet, Oral, QDAY PRN **AND** magnesium hydroxide (MILK OF MAGNESIA) suspension 30 mL, 30 mL, Oral, QDAY PRN **AND** bisacodyl (DULCOLAX) suppository 10 mg, 10 mg, Rectal, QDAY PRN, Amauri Jasso M.D.  •  Respiratory Care per Protocol, , Nebulization, Continuous RT, Amauri Jasso M.D.  •  acetaminophen (TYLENOL) tablet 650 mg, 650 mg, Oral, Q6HRS PRN, Amauri Jasso M.D., 650 mg at 11/04/18 0515  •  Notify, , , Once **AND** glucose 4 g chewable tablet 16 g, 16 g, Oral, Q15 MIN PRN **AND** dextrose 50% (D50W) injection 25 mL, 25 mL, Intravenous, Q15 MIN PRN, Catarino Moctezuma M.D.    Current Outpatient Medications:  Prescriptions Prior to Admission   Medication Sig Dispense Refill Last Dose   • ibuprofen (MOTRIN) 200 MG Tab Take 200 mg by mouth every 6 hours as needed.      • levothyroxine (SYNTHROID) 75 MCG Tab Take 75 mcg by mouth Every morning on an empty stomach.      • artificial tears 1.4 % Solution Place 1 Drop in right eye as needed.          Medication Allergy:  No Known Allergies    Family  "History:  No family history on file.    Social History:  Social History     Social History   • Marital status: Single     Spouse name: N/A   • Number of children: N/A   • Years of education: N/A     Occupational History   • Not on file.     Social History Main Topics   • Smoking status: Never Smoker   • Smokeless tobacco: Not on file   • Alcohol use No   • Drug use: No   • Sexual activity: Not on file     Other Topics Concern   • Not on file     Social History Narrative   • No narrative on file         Physical Exam:   Vitals/   Weight/BMI: Body mass index is 25.61 kg/m².  Blood pressure 160/101, pulse 97, temperature 36.7 °C (98 °F), resp. rate (!) 23, height 1.651 m (5' 5\"), weight 69.8 kg (153 lb 14.1 oz), SpO2 94 %, not currently breastfeeding.  Vitals:    11/04/18 0000 11/04/18 0318 11/04/18 0415 11/04/18 0730   BP: 150/100  (!) 175/102 160/101   Pulse: 98  100 97   Resp: 18  17 (!) 23   Temp: 36.4 °C (97.6 °F)  36.1 °C (97 °F) 36.7 °C (98 °F)   SpO2: 94%  94% 94%   Weight:  69.8 kg (153 lb 14.1 oz)     Height:         Oxygen Therapy:  Pulse Oximetry: 94 %, O2 (LPM): 0, O2 Delivery: None (Room Air)    Physical Exam   Constitutional: She is oriented to person, place, and time. She appears well-developed and well-nourished. No distress.   Pleasant, in no distress   HENT:   Head: Normocephalic and atraumatic.   Eyes: Pupils are equal, round, and reactive to light. Conjunctivae and EOM are normal.   Neck: No JVD present.   Cardiovascular: Normal rate, regular rhythm and normal heart sounds.  Exam reveals no gallop and no friction rub.    No murmur heard.  Pulmonary/Chest: Effort normal and breath sounds normal. No respiratory distress. She has no wheezes. She has no rales.   Abdominal: Soft. Bowel sounds are normal. She exhibits no distension.   Musculoskeletal: She exhibits no edema (no lower extremity edema bilaterally).   Neurological: She is alert and oriented to person, place, and time.   Skin: Skin is warm " and dry. No rash noted. She is not diaphoretic. No erythema. No pallor.   Psychiatric: She has a normal mood and affect. Her behavior is normal. Judgment and thought content normal.   Nursing note and vitals reviewed.      MDM (Data Review):     Records reviewed and summarized in current documentation    Lab Data Review:  Recent Results (from the past 24 hour(s))   BASIC METABOLIC PANEL    Collection Time: 11/04/18  1:47 AM   Result Value Ref Range    Sodium 142 135 - 145 mmol/L    Potassium 3.9 3.6 - 5.5 mmol/L    Chloride 109 96 - 112 mmol/L    Co2 26 20 - 33 mmol/L    Glucose 98 65 - 99 mg/dL    Bun 28 (H) 8 - 22 mg/dL    Creatinine 0.80 0.50 - 1.40 mg/dL    Calcium 9.4 8.5 - 10.5 mg/dL    Anion Gap 7.0 0.0 - 11.9   ESTIMATED GFR    Collection Time: 11/04/18  1:47 AM   Result Value Ref Range    GFR If African American >60 >60 mL/min/1.73 m 2    GFR If Non African American >60 >60 mL/min/1.73 m 2       Imaging/Procedures Review (all reviewed and pertinent for):      EKG (11/1/2018, images and report reviewed, my interpretation): Shows ventricularly paced rhythm with atrial rhythm appearing to be likely 2-1 flutter versus sinus tachycardia.  There was one PVC on that strip.  I reviewed the tracings and report of a repeat EKG on 11/clear sinus tachycardia with a ventricular rate of 107 bpm, and nonspecific high lateral T wave flattening.  That EKG is roughly unchanged compared to a couple of previous EKGs from January 2012.    Echocardiogram, 11/3/2018, images and report reviewed, my interpretation: Shows mild global hypokinesis with a left ventricular ejection fraction of 45%, there was a gradient measured across the calcified bioprosthetic aortic valve at 13 mmHg at a heart rate of 96 bpm.  My review of that tracing shows that it was probably a bit of an over estimation, and I manually measured the mean gradient around 5 mmHg.     Impression and Recommendations:       1.  Moderate calcific mitral stenosis: This is  best managed by slowing the patient's heart rate down, which is a bit on the tachycardic side at this time.  I agree with metoprolol tartrate 25 mg p.o. twice daily using a low dose especially in light of her age and recent falls.  I do not think additional investigation with a transesophageal echocardiogram as needed.  I discussed that with the patient, and we will defer that test.    2.  Paroxysmal atrial fibrillation: In light of her moderate calcific mitral stenosis I do think this is consistent with valvular atrial fibrillation and that she is at very high risk for stroke.  I discussed with her that she absolutely should be back on Coumadin in light of her very high risk of recurrent stroke.  She accepted that recommendation.  I would recommend restarting that medication, and I will send a copy of this note to her outpatient cardiologist so that he can follow her INR levels were arranged for Coumadin clinic follow-up in [place].  I would also continue aspirin at 81 mg p.o. daily for stroke prevention given her bioprosthetic valve.    Hypertension/dyslipidemia: Per the primary service    I have no other recommendations at this time, and I will sign off.    Chong Mccain MD  Cardiologist, [facility] [place]

## 2018-11-04 NOTE — PROGRESS NOTES
Received bedside report from PM RN Jami. Assumed patient care. Chart reviewed. Pt was resting in bed. A&O x 4. BPs are elevated per PM RN. No concerns, complaints or distress.  POC updated with pt and on the patient communication board. Bed locked and in the lowest position. Call light within reach. Tele box on. SR 96 on the monitor. Will continue to monitor.

## 2018-11-05 PROBLEM — T82.857A PROSTHETIC MITRAL VALVE STENOSIS: Status: ACTIVE | Noted: 2018-11-05

## 2018-11-05 LAB
ANION GAP SERPL CALC-SCNC: 4 MMOL/L (ref 0–11.9)
BASOPHILS # BLD AUTO: 0.8 % (ref 0–1.8)
BASOPHILS # BLD: 0.04 K/UL (ref 0–0.12)
BUN SERPL-MCNC: 17 MG/DL (ref 8–22)
CALCIUM SERPL-MCNC: 9.4 MG/DL (ref 8.5–10.5)
CHLORIDE SERPL-SCNC: 105 MMOL/L (ref 96–112)
CO2 SERPL-SCNC: 28 MMOL/L (ref 20–33)
CREAT SERPL-MCNC: 0.71 MG/DL (ref 0.5–1.4)
EKG IMPRESSION: NORMAL
EOSINOPHIL # BLD AUTO: 0.23 K/UL (ref 0–0.51)
EOSINOPHIL NFR BLD: 4.7 % (ref 0–6.9)
ERYTHROCYTE [DISTWIDTH] IN BLOOD BY AUTOMATED COUNT: 41.2 FL (ref 35.9–50)
GLUCOSE SERPL-MCNC: 100 MG/DL (ref 65–99)
HCT VFR BLD AUTO: 39.3 % (ref 37–47)
HGB BLD-MCNC: 13.4 G/DL (ref 12–16)
IMM GRANULOCYTES # BLD AUTO: 0.01 K/UL (ref 0–0.11)
IMM GRANULOCYTES NFR BLD AUTO: 0.2 % (ref 0–0.9)
INR PPP: 1.01 (ref 0.87–1.13)
LYMPHOCYTES # BLD AUTO: 0.93 K/UL (ref 1–4.8)
LYMPHOCYTES NFR BLD: 18.9 % (ref 22–41)
MCH RBC QN AUTO: 29.8 PG (ref 27–33)
MCHC RBC AUTO-ENTMCNC: 34.1 G/DL (ref 33.6–35)
MCV RBC AUTO: 87.3 FL (ref 81.4–97.8)
MONOCYTES # BLD AUTO: 0.5 K/UL (ref 0–0.85)
MONOCYTES NFR BLD AUTO: 10.1 % (ref 0–13.4)
NEUTROPHILS # BLD AUTO: 3.22 K/UL (ref 2–7.15)
NEUTROPHILS NFR BLD: 65.3 % (ref 44–72)
NRBC # BLD AUTO: 0 K/UL
NRBC BLD-RTO: 0 /100 WBC
PLATELET # BLD AUTO: 181 K/UL (ref 164–446)
PMV BLD AUTO: 9.7 FL (ref 9–12.9)
POTASSIUM SERPL-SCNC: 3.9 MMOL/L (ref 3.6–5.5)
PROTHROMBIN TIME: 13.4 SEC (ref 12–14.6)
RBC # BLD AUTO: 4.5 M/UL (ref 4.2–5.4)
SODIUM SERPL-SCNC: 137 MMOL/L (ref 135–145)
WBC # BLD AUTO: 4.9 K/UL (ref 4.8–10.8)

## 2018-11-05 PROCEDURE — A9270 NON-COVERED ITEM OR SERVICE: HCPCS | Performed by: STUDENT IN AN ORGANIZED HEALTH CARE EDUCATION/TRAINING PROGRAM

## 2018-11-05 PROCEDURE — 700102 HCHG RX REV CODE 250 W/ 637 OVERRIDE(OP): Performed by: STUDENT IN AN ORGANIZED HEALTH CARE EDUCATION/TRAINING PROGRAM

## 2018-11-05 PROCEDURE — 92523 SPEECH SOUND LANG COMPREHEN: CPT

## 2018-11-05 PROCEDURE — G8997 SWALLOW GOAL STATUS: HCPCS | Mod: CI

## 2018-11-05 PROCEDURE — 92526 ORAL FUNCTION THERAPY: CPT

## 2018-11-05 PROCEDURE — 85610 PROTHROMBIN TIME: CPT

## 2018-11-05 PROCEDURE — 700111 HCHG RX REV CODE 636 W/ 250 OVERRIDE (IP): Performed by: STUDENT IN AN ORGANIZED HEALTH CARE EDUCATION/TRAINING PROGRAM

## 2018-11-05 PROCEDURE — 770020 HCHG ROOM/CARE - TELE (206)

## 2018-11-05 PROCEDURE — 93005 ELECTROCARDIOGRAM TRACING: CPT | Performed by: STUDENT IN AN ORGANIZED HEALTH CARE EDUCATION/TRAINING PROGRAM

## 2018-11-05 PROCEDURE — 80048 BASIC METABOLIC PNL TOTAL CA: CPT

## 2018-11-05 PROCEDURE — G8996 SWALLOW CURRENT STATUS: HCPCS | Mod: CJ

## 2018-11-05 PROCEDURE — 36415 COLL VENOUS BLD VENIPUNCTURE: CPT

## 2018-11-05 PROCEDURE — 99232 SBSQ HOSP IP/OBS MODERATE 35: CPT | Mod: GC | Performed by: INTERNAL MEDICINE

## 2018-11-05 PROCEDURE — 85025 COMPLETE CBC W/AUTO DIFF WBC: CPT

## 2018-11-05 PROCEDURE — 93010 ELECTROCARDIOGRAM REPORT: CPT | Performed by: INTERNAL MEDICINE

## 2018-11-05 RX ORDER — WARFARIN SODIUM 5 MG/1
5 TABLET ORAL
Status: DISCONTINUED | OUTPATIENT
Start: 2018-11-05 | End: 2018-11-08 | Stop reason: HOSPADM

## 2018-11-05 RX ADMIN — ACETAMINOPHEN 650 MG: 325 TABLET, FILM COATED ORAL at 11:00

## 2018-11-05 RX ADMIN — METOPROLOL TARTRATE 37.5 MG: 25 TABLET, FILM COATED ORAL at 17:37

## 2018-11-05 RX ADMIN — TRAMADOL HYDROCHLORIDE 50 MG: 50 TABLET, FILM COATED ORAL at 06:13

## 2018-11-05 RX ADMIN — METOPROLOL TARTRATE 25 MG: 25 TABLET, FILM COATED ORAL at 05:51

## 2018-11-05 RX ADMIN — ENOXAPARIN SODIUM 40 MG: 100 INJECTION SUBCUTANEOUS at 05:54

## 2018-11-05 RX ADMIN — LEVOTHYROXINE SODIUM 75 MCG: 75 TABLET ORAL at 05:53

## 2018-11-05 RX ADMIN — ASPIRIN 81 MG: 81 TABLET, CHEWABLE ORAL at 05:53

## 2018-11-05 RX ADMIN — TRAMADOL HYDROCHLORIDE 50 MG: 50 TABLET, FILM COATED ORAL at 12:48

## 2018-11-05 RX ADMIN — POLYVINYL ALCOHOL 1 DROP: 14 SOLUTION/ DROPS OPHTHALMIC at 17:40

## 2018-11-05 RX ADMIN — STANDARDIZED SENNA CONCENTRATE AND DOCUSATE SODIUM 2 TABLET: 8.6; 5 TABLET, FILM COATED ORAL at 17:37

## 2018-11-05 RX ADMIN — POLYVINYL ALCOHOL 1 DROP: 14 SOLUTION/ DROPS OPHTHALMIC at 05:54

## 2018-11-05 RX ADMIN — WARFARIN SODIUM 5 MG: 5 TABLET ORAL at 17:39

## 2018-11-05 RX ADMIN — STANDARDIZED SENNA CONCENTRATE AND DOCUSATE SODIUM 2 TABLET: 8.6; 5 TABLET, FILM COATED ORAL at 05:51

## 2018-11-05 RX ADMIN — POLYETHYLENE GLYCOL 3350 1 PACKET: 17 POWDER, FOR SOLUTION ORAL at 06:13

## 2018-11-05 ASSESSMENT — ENCOUNTER SYMPTOMS
PALPITATIONS: 0
SPUTUM PRODUCTION: 0
INSOMNIA: 0
SINUS PAIN: 0
BRUISES/BLEEDS EASILY: 0
NAUSEA: 0
WEIGHT LOSS: 0
ABDOMINAL PAIN: 0
HALLUCINATIONS: 0
EYE PAIN: 0
COUGH: 0
ORTHOPNEA: 0
MYALGIAS: 0
DIARRHEA: 0
VOMITING: 0
BLURRED VISION: 1
CONSTIPATION: 0
HEADACHES: 0
SPEECH CHANGE: 0
TINGLING: 0
FEVER: 0
NERVOUS/ANXIOUS: 0
SORE THROAT: 0
FOCAL WEAKNESS: 0
LOSS OF CONSCIOUSNESS: 0
SENSORY CHANGE: 0
TREMORS: 0
SHORTNESS OF BREATH: 0
EYE REDNESS: 0
SEIZURES: 0
DOUBLE VISION: 0
DIZZINESS: 0
CHILLS: 0
EYE DISCHARGE: 0
BACK PAIN: 1
DEPRESSION: 0

## 2018-11-05 ASSESSMENT — CHA2DS2 SCORE
AGE 65 TO 74: NO
HYPERTENSION: NO
AGE 75 OR GREATER: YES
CHA2DS2 VASC SCORE: 5
VASCULAR DISEASE: NO
DIABETES: NO
PRIOR STROKE OR TIA OR THROMBOEMBOLISM: YES
CHF OR LEFT VENTRICULAR DYSFUNCTION: NO
SEX: FEMALE

## 2018-11-05 ASSESSMENT — PAIN SCALES - GENERAL
PAINLEVEL_OUTOF10: 5
PAINLEVEL_OUTOF10: 0
PAINLEVEL_OUTOF10: 0

## 2018-11-05 ASSESSMENT — LIFESTYLE VARIABLES: SUBSTANCE_ABUSE: 0

## 2018-11-05 NOTE — PROGRESS NOTES
Per CNA, patient complains of chest pain. When assessing patient, patient states she is having some chest tightness. Patient states she has had this in the past and talked to her PCP about it. She states it mostly occurs when she is side lying. Patient states her chest tightness is minimal and declines medication. EKG taken at this time and no changes are seen. Patient states she will let RN know if chest tightness worsens.

## 2018-11-05 NOTE — ASSESSMENT & PLAN NOTE
No evidence of heart failure   Continue to control HR, increase metoprolol dose  Resume AC  Lisinopril 2.5 mg Qdaily

## 2018-11-05 NOTE — PROGRESS NOTES
Internal Medicine Interval Note  Note Author: Catarino Moctezuma M.D.     Name Cris Pham 1934   Age/Sex 84 y.o. female   MRN [id number]   Code Status DNAR/DNI     After 5PM or if no immediate response to page, please call for cross-coverage  Attending/Team: Dr Cruz/ Salena See Patient List for primary contact information  Call [phone] to page    1st Call - Day Intern (R1):   Dr Moctezuma 2nd Call - Day Sr. Resident (R2/R3):   Dr Rouse         Reason for interval visit  (Principal Problem)   Atrial fibrillation  Meningioma  B/l hearing loss  GLF    Interval Problem Daily Status Update  (24 hours, problem oriented, brief subjective history, new lab/imaging data pertinent to that problem)     - Pt seen and examined at bedside this AM, stable and NAD. No event on tele overnight    - No headache, blurred vision, tinnitus  - some chest tightness last PM, subsided spontaneously, says she gets this frequently while reclining, chest tenderness to palpation    - Metoprolol to 25 BID in view of tachycardia and hypertension    - Echo: significant gradient across mitral valve (13 mmHg) - Cardiology recs MS probable - rec Warfarin and ASA as [id number] score 3 (paroxysmal Afib) and bioprosthetic valve    - patient reluctantly agrees to SNF, SLP recommend SNF and PT recommends SNF or group home placement - PT to re-consult regarding placement recs     - Placement issues: pt's resources not enough to cover group home/ SNF; CW on board    - Josh De Leon (pt's landlord/ caregiver) - [phone] for further correspondence - he is also her NP        Review of Systems   Constitutional: Negative for chills, fever and weight loss.   HENT: Positive for hearing loss. Negative for congestion, sinus pain and sore throat.    Eyes: Negative for double vision, pain, discharge and redness.        Blindness in right eye     Respiratory: Negative for cough, sputum production and shortness of breath.     Cardiovascular: Negative for chest pain, palpitations, orthopnea and leg swelling.   Gastrointestinal: Negative for abdominal pain, constipation, diarrhea, nausea and vomiting.   Genitourinary: Negative for dysuria, frequency and urgency.   Musculoskeletal: Positive for back pain. Negative for joint pain and myalgias.        Pain in right shoulder and upper back   Skin: Negative for itching and rash.   Neurological: Negative for dizziness, tingling, tremors, sensory change, speech change, focal weakness, seizures, loss of consciousness and headaches.   Endo/Heme/Allergies: Negative for environmental allergies. Does not bruise/bleed easily.   Psychiatric/Behavioral: Negative for depression, hallucinations, substance abuse and suicidal ideas. The patient is not nervous/anxious and does not have insomnia.        Disposition/Barriers to discharge:   Likely SNF/ group home as some instability and concern for immediate independence; PT to re-evaluate for Home health eligibilty    Consultants/Specialty  Dr Esmer KIRKLAND - Neurosurgery  Dr Adán KIRKLAND - Cardiology  PCP: Pcp Unobtainable By       Quality Measures  Quality-Core Measures   Reviewed items::  Labs reviewed, Medications reviewed and Radiology images reviewed  Paul catheter::  No Paul  DVT prophylaxis pharmacological::  Enoxaparin (Lovenox)  DVT prophylaxis - mechanical:  SCDs  Ulcer Prophylaxis::  No          Physical Exam       Vitals:    11/05/18 0100 11/05/18 0400 11/05/18 0820 11/05/18 1200   BP: 157/105 136/83 127/79 115/70   Pulse: 100 (!) 103 93 88   Resp: 16 15 16 16   Temp: 36.6 °C (97.8 °F) 36.5 °C (97.7 °F) 36.4 °C (97.5 °F) 36.6 °C (97.9 °F)   SpO2: 91% 91% 91% 91%   Weight: 70.2 kg (154 lb 12.2 oz)      Height:         Body mass index is 25.75 kg/m². Weight: 70.2 kg (154 lb 12.2 oz)  Oxygen Therapy:  Pulse Oximetry: 91 %, O2 (LPM): 0, O2 Delivery: None (Room Air)    Physical Exam   Constitutional: She is oriented to person, place, and time and  well-developed, well-nourished, and in no distress. No distress.   HENT:   Head: Normocephalic and atraumatic.   Nose: Nose normal.   Mouth/Throat: No oropharyngeal exudate.   Eyes: Pupils are equal, round, and reactive to light. Conjunctivae and EOM are normal. Right eye exhibits no discharge. Left eye exhibits no discharge.   Right sided ptosis   Neck: Normal range of motion. Neck supple.   Cardiovascular: Normal rate, regular rhythm and normal heart sounds.  Exam reveals no friction rub.    No murmur heard.  Pulmonary/Chest: Effort normal. No respiratory distress. She has no wheezes. She has no rales.   Abdominal: Soft. She exhibits no distension. There is no tenderness. There is no guarding.   Genitourinary:   Genitourinary Comments: declined   Musculoskeletal: She exhibits no edema or deformity.   Neurological: She is alert and oriented to person, place, and time.   Skin: Skin is warm and dry. No rash noted. She is not diaphoretic. No erythema.   Psychiatric: Mood and affect normal.   Nursing note and vitals reviewed.            Assessment/Plan     * Fall from ground level   Assessment & Plan    - pt fell and injured buttocks, did not injure head or lose consciousness per hx  - was unable to move, alerted neighbors  - likely mechanical instability causing fall    Plan  - telemetry  - aspiration, seizure and fall precautions       Prosthetic mitral valve stenosis   Assessment & Plan    No evidence of heart failure   Continue to control HR, increase metoprolol dose  Resume AC  Consider ACE I, d/w cardiology.      Paroxysmal atrial fibrillation (HCC)   Assessment & Plan    - no pacemaker in situ  - likely secondary to chronic MR  - episode of A fib with RVR which converted to sinus tachy a few hours later  - metoprolol 25 BID in view of hypertension and tachycardia  -CHADSVASC of 3  -Echo: significant gradient across mitral valve concern for MS per Cardiology - rec Warfarin and MATI as outpatient    Plan  -  Telemetry  - Warfarin per pharmacy protocol     Meningioma (HCC)   Assessment & Plan    - diagnosed 2012, managed conservatively and with radiation  - 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 bone  - SNHL,  no current tinnitus  - erosion of petrous bone, midline shift and vasogenic edema, rapid increase in size of lesion  - pt refuses treatment of meningioma, Neurosurgery recommends discontinuation of Keppra and Decadron as no evidence of raised ICP  -if acute changes in mentation or neurological status can re consult neurosurgery       Hypothyroidism   Assessment & Plan    - TSH 0.65, free T4 1.19, WNL  - continue home dose of Synthroid 75 mcg     History of colon cancer   Assessment & Plan    - S/P resectional surgery x 3  - currently no GI compliants  - CTM     Blind right eye   Assessment & Plan    - unclear etiology per caregiver  - complete loss of vision  - left eye retains vision but has cataract per hx    Plan  - fall precautions  - CTM     Bell palsy   Assessment & Plan    - 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

## 2018-11-05 NOTE — THERAPY
"Speech Language Therapy dysphagia treatment completed.   Functional Status:  Patient required cues to sit upright in bed when eating/drinking. She consumed small sips of thin water from the cup without overt signs of aspiration. Her vocal quality remained clear. No labial spillage was noted even though she has significant right facial palsy. She does have mild oral residue and required max cues with written instructions to clear oral cavity.  Recommendations:  Recommend to continue with D2, NTL. ok for sips of thin liquids between meals. She needs assistance getting HOB upright at 90 degrees.      Plan of Care: Will benefit from Speech Therapy 3 times per week  Post-Acute Therapy: Recommend inpatient transitional care services for continued speech therapy services. Please consider physiatry (PM&R) consult.       See \"Rehab Therapy-Acute\" Patient Summary Report for complete documentation.          "

## 2018-11-05 NOTE — CARE PLAN
Problem: Knowledge Deficit  Goal: Knowledge of disease process/condition, treatment plan, diagnostic tests, and medications will improve    Intervention: Explain information regarding disease process/condition, treatment plan, diagnostic tests, and medications and document in education  Pt educated on plan of care, medications, pain management, and disease processes. Pt verbalized understanding and was encouraged to ask questions. All questions answered.         Problem: Psychosocial Needs:  Goal: Level of anxiety will decrease  Outcome: PROGRESSING AS EXPECTED  Anxiety triggers identified. Coping skills defined and in use.

## 2018-11-05 NOTE — PROGRESS NOTES
Bedside shift report received. Assumed care of patient at this time. Patient sitting in chair at bedside. Personal items and call light within reach. Chair alarm activated. No further requests per patient at this time.

## 2018-11-06 LAB
INR PPP: 1.04 (ref 0.87–1.13)
PROTHROMBIN TIME: 13.7 SEC (ref 12–14.6)

## 2018-11-06 PROCEDURE — 97530 THERAPEUTIC ACTIVITIES: CPT

## 2018-11-06 PROCEDURE — A9270 NON-COVERED ITEM OR SERVICE: HCPCS | Performed by: STUDENT IN AN ORGANIZED HEALTH CARE EDUCATION/TRAINING PROGRAM

## 2018-11-06 PROCEDURE — 36415 COLL VENOUS BLD VENIPUNCTURE: CPT

## 2018-11-06 PROCEDURE — 700102 HCHG RX REV CODE 250 W/ 637 OVERRIDE(OP): Performed by: STUDENT IN AN ORGANIZED HEALTH CARE EDUCATION/TRAINING PROGRAM

## 2018-11-06 PROCEDURE — 770020 HCHG ROOM/CARE - TELE (206)

## 2018-11-06 PROCEDURE — 85610 PROTHROMBIN TIME: CPT

## 2018-11-06 PROCEDURE — 700111 HCHG RX REV CODE 636 W/ 250 OVERRIDE (IP): Performed by: STUDENT IN AN ORGANIZED HEALTH CARE EDUCATION/TRAINING PROGRAM

## 2018-11-06 PROCEDURE — 99232 SBSQ HOSP IP/OBS MODERATE 35: CPT | Mod: GC | Performed by: INTERNAL MEDICINE

## 2018-11-06 RX ADMIN — STANDARDIZED SENNA CONCENTRATE AND DOCUSATE SODIUM 2 TABLET: 8.6; 5 TABLET, FILM COATED ORAL at 17:21

## 2018-11-06 RX ADMIN — POLYVINYL ALCOHOL 1 DROP: 14 SOLUTION/ DROPS OPHTHALMIC at 06:27

## 2018-11-06 RX ADMIN — TRAMADOL HYDROCHLORIDE 50 MG: 50 TABLET, FILM COATED ORAL at 05:24

## 2018-11-06 RX ADMIN — TRAMADOL HYDROCHLORIDE 50 MG: 50 TABLET, FILM COATED ORAL at 17:24

## 2018-11-06 RX ADMIN — ENOXAPARIN SODIUM 40 MG: 100 INJECTION SUBCUTANEOUS at 06:26

## 2018-11-06 RX ADMIN — METOPROLOL TARTRATE 37.5 MG: 25 TABLET, FILM COATED ORAL at 06:26

## 2018-11-06 RX ADMIN — METOPROLOL TARTRATE 37.5 MG: 25 TABLET, FILM COATED ORAL at 17:21

## 2018-11-06 RX ADMIN — POLYVINYL ALCOHOL 1 DROP: 14 SOLUTION/ DROPS OPHTHALMIC at 17:21

## 2018-11-06 RX ADMIN — ASPIRIN 81 MG: 81 TABLET, CHEWABLE ORAL at 06:27

## 2018-11-06 RX ADMIN — LEVOTHYROXINE SODIUM 75 MCG: 75 TABLET ORAL at 05:16

## 2018-11-06 RX ADMIN — WARFARIN SODIUM 5 MG: 5 TABLET ORAL at 17:21

## 2018-11-06 ASSESSMENT — ENCOUNTER SYMPTOMS
EYE REDNESS: 0
DIZZINESS: 0
SORE THROAT: 0
CONSTIPATION: 0
ABDOMINAL PAIN: 0
SEIZURES: 0
BRUISES/BLEEDS EASILY: 0
EYE DISCHARGE: 0
TREMORS: 0
SHORTNESS OF BREATH: 0
DEPRESSION: 0
HALLUCINATIONS: 0
INSOMNIA: 0
FOCAL WEAKNESS: 0
SENSORY CHANGE: 0
DOUBLE VISION: 0
ORTHOPNEA: 0
NERVOUS/ANXIOUS: 0
EYE REDNESS: 1
CHILLS: 0
VOMITING: 0
LOSS OF CONSCIOUSNESS: 0
COUGH: 0
WEIGHT LOSS: 0
NAUSEA: 0
SPEECH CHANGE: 0
SINUS PAIN: 0
TINGLING: 0
HEADACHES: 0
MYALGIAS: 0
PALPITATIONS: 0
SPUTUM PRODUCTION: 0
DIARRHEA: 0
BACK PAIN: 0
EYE PAIN: 0
FEVER: 0

## 2018-11-06 ASSESSMENT — PAIN SCALES - GENERAL
PAINLEVEL_OUTOF10: 6
PAINLEVEL_OUTOF10: 4

## 2018-11-06 ASSESSMENT — GAIT ASSESSMENTS
ASSISTIVE DEVICE: FRONT WHEEL WALKER
DISTANCE (FEET): 50
GAIT LEVEL OF ASSIST: CONTACT GUARD ASSIST

## 2018-11-06 ASSESSMENT — COGNITIVE AND FUNCTIONAL STATUS - GENERAL
SUGGESTED CMS G CODE MODIFIER MOBILITY: CK
TOILETING: A LITTLE
MOVING TO AND FROM BED TO CHAIR: A LITTLE
STANDING UP FROM CHAIR USING ARMS: A LITTLE
SUGGESTED CMS G CODE MODIFIER DAILY ACTIVITY: CJ
DRESSING REGULAR UPPER BODY CLOTHING: A LITTLE
WALKING IN HOSPITAL ROOM: A LITTLE
HELP NEEDED FOR BATHING: A LITTLE
MOBILITY SCORE: 19
DRESSING REGULAR LOWER BODY CLOTHING: A LITTLE
DAILY ACTIVITIY SCORE: 20
CLIMB 3 TO 5 STEPS WITH RAILING: A LOT

## 2018-11-06 ASSESSMENT — LIFESTYLE VARIABLES: SUBSTANCE_ABUSE: 0

## 2018-11-06 NOTE — THERAPY
"Occupational Therapy Treatment completed with focus on ADLs and ADL transfers.  Functional Status:  Min/CGA with ADLs and txfs  Plan of Care: Will benefit from Occupational Therapy 3 times per week  Discharge Recommendations:  Equipment Will Continue to Assess for Equipment Needs. Post-acute therapy Discharge to a transitional care facility for continued therapy services.    See \"Rehab Therapy-Acute\" Patient Summary Report for complete documentation.   "

## 2018-11-06 NOTE — PROGRESS NOTES
Inpatient Anticoagulation Service Note    Date: 11/6/2018  Reason for Anticoagulation: Atrial Fibrillation, Bioprosthetic Valve Replacement   [id number] VASc Score: 5    Hemoglobin Value: 13.4  Hematocrit Value: 39.3  Lab Platelet Value: 181  Target INR: 2.0 to 3.0    INR from last 7 days     Date/Time INR Value    11/06/18 0244  1.04    11/05/18 1319  1.01    11/02/18 0833  1.09        Dose from last 7 days     Date/Time Dose (mg)    11/06/18 1500  5    11/05/18 1600  5        Average Dose (mg): New Start Warfarin  Significant Interactions: Aspirin  Bridge Therapy: No    Reversal Agent Administered: Not Applicable  Comments: New start warfarin for history of bioprosthetic mitral valve and afib. Patient is currently on enoxaparin 40 mg subQ as well until INR therapeutic. No new DDIs, H/H stable. Will continue to monitor.    Plan:  Warfarin 5 mg PO daily  Education Material Provided?: No  Pharmacist suggested discharge dosing: warfarin 5 mg PO daily with follow up within 48 hours with Coumadin Clinic.     Paige Miller, PharmD

## 2018-11-06 NOTE — PROGRESS NOTES
Bedside report received, pt care assumed. Pt AOx4, safety precautions in place, call bell in reach. Instructed pt to call for assistance if needed.

## 2018-11-06 NOTE — NON-PROVIDER
Internal Medicine Medical Student Note  Note Author: Janette Arango, Student    Name Cris Pham 1934   Age/Sex 84 y.o. female   MRN [id number]   Code Status FULL     After 5PM or if no immediate response to page, please call for cross-coverage  Attending/Team: Dr. Cruz / Narciso See Patient List for primary contact information  Call [phone] to page after hours   1st Call - Day Intern (R1):   Dr. Baez 2nd Call - Day Sr. Resident (R2/R3):   Dr. Rouse         Reason for interval visit  (Principal Problem)   Fall from ground level    Interval Problem Daily Status Update  (24 hours)   Pt stable and in no acute distress this morning. Reports some blurred vision and difficulty reading written materials. This limited history taking and ROS today due to her profound deafness and difficulty reading written communication.    Review of Systems   Constitutional: Negative for fever.   HENT: Positive for hearing loss. Negative for congestion.    Eyes: Positive for blurred vision.   Respiratory: Negative for cough and shortness of breath.    Cardiovascular: Negative for leg swelling.   Skin: Negative for rash.   Neurological: Negative for seizures and headaches.               Physical Exam       Vitals:    18 0400 18 0820 18 1200 18 1600   BP: 136/83 127/79 115/70 137/80   Pulse: (!) 103 93 88 (!) 102   Resp: 15 16 16 20   Temp: 36.5 °C (97.7 °F) 36.4 °C (97.5 °F) 36.6 °C (97.9 °F) 37 °C (98.6 °F)   SpO2: 91% 91% 91% 92%   Weight:       Height:         Body mass index is 25.75 kg/m². Weight: 70.2 kg (154 lb 12.2 oz)  Oxygen Therapy:  Pulse Oximetry: 92 %, O2 (LPM): 0, O2 Delivery: None (Room Air)    Physical Exam   Constitutional: She is well-developed, well-nourished, and in no distress.   Cardiovascular: Normal rate, regular rhythm and normal heart sounds.    Pulmonary/Chest: Effort normal and breath sounds normal. No respiratory distress.   Abdominal: Soft.   Neurological:  She is alert.   Right sided ptosis and facial droop, secondary to chronic Bell's palsy   Skin: Skin is warm and dry.             Assessment/Plan   1) Fall from ground level  - Continue telemetry.  - Aspiration, seizure and fall precautions.  - Working with CW on discharge to SNF. Pt does not have adequate monitoring at home.     2) Prosthetic mitral valve stenosis  - ECHO showed no evidence of heart failure.  - Continue ASA 81 mg PO per cardiology recommendations  - Discuss ACE inhibitor treatment with cardiology  - Start Coumadin 5mg.  - Increase metoprolol dose from 25mg to 37.5mg BID to optimize heart rate control.    3) Paroxysmal atrial fibrillation  - No episodes of atrial fibrillation today.  - CHADS-VASC score of 3.  - Continue metoprolol.  - Start Coumadin.  - Continue telemetry.    4) Meningioma  - No current focal neuro deficits, acute changes in mentation  - Continue to monitor; re-consult neurosurgery if acute changes in mentation or neuro status occur.    5) Other Chronic Medical Conditions  - Continue home treatment.

## 2018-11-06 NOTE — DISCHARGE PLANNING
Anticipated Discharge Disposition: SNF     Action: LSW met with Pt at bedside, LSW spent time typing up information on [place], [place] skilled nursing address and map locations to allow Pt to be able to read. Pt stated she has a hard time seeing small font and hard of hearing. Pt completed SNF choice form for  [facility] as she believes she has been to [facility] [facility]     Pt resides at [facility] in [place], [place], Pt is hoping to return to her apartment to get clothes and her phone. LSW explained it would be up to the policy of the SNF to allow Pt to retrieve her belongings (provide 4 hour pass etc)     LSW informed Pt she would reach out the manager to attempt to ensure hotel staff are aware of her hospitalization. LUZ MARINA completed for [facility] in [place].     Barriers to Discharge: SNF acceptance     Plan: Pt awaiting SNF acceptance

## 2018-11-06 NOTE — PROGRESS NOTES
Bedside report with KRISTY Diallo, pt is alert and oriented, on room air, pt is very hard of hearing, RN can write messages to pt on board. Call bell within reach, pt up to chair, understands to call for assistance with mobility, will continue to monitor.

## 2018-11-06 NOTE — PROGRESS NOTES
Inpatient Anticoagulation Service Note    Date: 2018  Reason for Anticoagulation: Atrial Fibrillation, Bioprosthetic Valve Replacement   [id number] VASc Score: 5    Hemoglobin Value: 13.4  Hematocrit Value: 39.3  Lab Platelet Value: 181  Target INR: 2.0 to 3.0    INR from last 7 days     Date/Time INR Value    18 1319  1.01    18 0833  1.09        Dose from last 7 days     Date/Time Dose (mg)    18 1600  5        Average Dose (mg): New start  Significant Interactions: Aspirin  Bridge Therapy: No   Reversal Agent Administered: Not Applicable    Comments: 83 yo female restarting warfarin for afib and a bioprosthetic mitral valve; she has been off warfarin for quite some time. She continues on prophylactic enoxaparin per MD until her INR is therapeutic. Her CBC is WNL with no notes of bleeding.    Plan: Start warfarin 5 mg with INR tomorrow morning  Education Material Provided?: No  Pharmacist suggested discharge dosin mg daily and INR within 48 hours of discharge     Fariha Phillips, PharmD, BCPS

## 2018-11-06 NOTE — PROGRESS NOTES
Internal Medicine Interval Note  Note Author: Catarino Moctezuma M.D.     Name Cris Pham 1934   Age/Sex 84 y.o. female   MRN [id number]   Code Status DNAR/DNI     After 5PM or if no immediate response to page, please call for cross-coverage  Attending/Team: Dr Cruz/ Salena See Patient List for primary contact information  Call [phone] to page    1st Call - Day Intern (R1):   Dr Moctezuma 2nd Call - Day Sr. Resident (R2/R3):   Dr Rouse       Reason for interval visit  (Principal Problem)   Atrial fibrillation  Meningioma  B/l hearing loss  GLF    Interval Problem Daily Status Update  (24 hours, problem oriented, brief subjective history, new lab/imaging data pertinent to that problem)     - Pt seen and examined at bedside this AM, stable and NAD; no AEON, no events on tele.  - no chest symptoms, no new complaints  - Metoprolol to 37.5 mg BID in view of continued hypertension and tachycardia, currently rate and pressure well controlled  - Warfarin to continue as outpatient, [id number] score 3  - bandage over right eye in view of excessive dryness from exposure  - pt in process of selecting SNF in [place] - likely [place], she has stayed there before  - medically clear for discharge    - Josh De Leon - [phone] for further correspondence - he is her former home health nurse - he has agreed to co-ordinate her care and bring in her personal belongings once she is in [place]      Review of Systems   Constitutional: Negative for chills, fever and weight loss.   HENT: Positive for hearing loss. Negative for congestion, sinus pain and sore throat.    Eyes: Negative for double vision, pain, discharge and redness.        Blindness in right eye     Respiratory: Negative for cough, sputum production and shortness of breath.    Cardiovascular: Negative for chest pain, palpitations, orthopnea and leg swelling.   Gastrointestinal: Negative for abdominal pain, constipation, diarrhea, nausea and  vomiting.   Genitourinary: Negative for dysuria, frequency and urgency.   Musculoskeletal: Negative for back pain, joint pain and myalgias.        Pain in right shoulder and upper back   Skin: Negative for itching and rash.   Neurological: Negative for dizziness, tingling, tremors, sensory change, speech change, focal weakness, seizures, loss of consciousness and headaches.   Endo/Heme/Allergies: Negative for environmental allergies. Does not bruise/bleed easily.   Psychiatric/Behavioral: Negative for depression, hallucinations, substance abuse and suicidal ideas. The patient is not nervous/anxious and does not have insomnia.        Disposition/Barriers to discharge:   To SNF    Consultants/Specialty  Dr Esmer KIRKLAND - Neurosurgery  Dr Adán KIRKLAND - Cardiology  PCP: Pcp Unobtainable By       Quality Measures  Quality-Core Measures   Reviewed items::  Labs reviewed, Medications reviewed and Radiology images reviewed  Paul catheter::  No Paul  DVT prophylaxis pharmacological::  Enoxaparin (Lovenox)  DVT prophylaxis - mechanical:  SCDs  Ulcer Prophylaxis::  No          Physical Exam       Vitals:    11/06/18 0400 11/06/18 0800 11/06/18 1300 11/06/18 1648   BP: 148/89 145/86 133/76 133/79   Pulse: 99 93 95 92   Resp: 16 17 16 17   Temp: 36.6 °C (97.8 °F) 36.8 °C (98.2 °F) 37 °C (98.6 °F) 36.8 °C (98.2 °F)   SpO2: 92% 91% 91% 94%   Weight:       Height:         Body mass index is 25.5 kg/m². Weight: 69.5 kg (153 lb 3.5 oz)  Oxygen Therapy:  Pulse Oximetry: 94 %, O2 (LPM): 0, O2 Delivery: None (Room Air)    Physical Exam   Constitutional: She is oriented to person, place, and time and well-developed, well-nourished, and in no distress. No distress.   HENT:   Head: Normocephalic and atraumatic.   Nose: Nose normal.   Mouth/Throat: No oropharyngeal exudate.   Eyes: Pupils are equal, round, and reactive to light. Conjunctivae and EOM are normal. Right eye exhibits no discharge. Left eye exhibits no discharge.   Right  sided ptosis   Neck: Normal range of motion. Neck supple.   Cardiovascular: Normal rate, regular rhythm and normal heart sounds.  Exam reveals no friction rub.    No murmur heard.  Pulmonary/Chest: Effort normal. No respiratory distress. She has no wheezes. She has no rales.   Abdominal: Soft. She exhibits no distension. There is no tenderness. There is no guarding.   Genitourinary:   Genitourinary Comments: declined   Musculoskeletal: She exhibits no edema or deformity.   Neurological: She is alert and oriented to person, place, and time.   Skin: Skin is warm and dry. No rash noted. She is not diaphoretic. No erythema.   Psychiatric: Mood and affect normal.   Nursing note and vitals reviewed.            Assessment/Plan     * Fall from ground level   Assessment & Plan    - no sequelae from fall, no dizziness  - no hx of instability on feet or prior falls  - risks of not anticoagulating greater than risks of bleed from potential future falls    Plan  - telemetry  - aspiration, seizure and fall precautions  - Warfarin       Prosthetic mitral valve stenosis   Assessment & Plan    No evidence of heart failure   Continue to control HR, increase metoprolol dose  Resume AC  Consider ACE I, d/w cardiology.      Paroxysmal atrial fibrillation (HCC)   Assessment & Plan    - pt asymptomatic  - no pacemaker in situ  - likely secondary to chronic MR  - currently in sinus rhythm  - CHADSVASC of 3  - Echo: significant gradient across mitral valve concern for MS per Cardiology - rec Warfarin and MATI as outpatient    Plan  - Telemetry  - Warfarin per pharmacy protocol  - metoprolol 37.5 BID in view of hypertension and tachycardia     Meningioma (HCC)   Assessment & Plan    - diagnosed 2012, managed conservatively and with radiation  - 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 bone  - SNHL, no current tinnitus  - erosion of petrous bone, midline shift and vasogenic edema, rapid increase in size of lesion  - asymptomatic  - pt refuses treatment of  meningioma, Neurosurgery recommends discontinuation of Keppra and Decadron as no evidence of raised ICP  - if acute changes in mentation or neurological status can re consult neurosurgery       Hypothyroidism   Assessment & Plan    - TFTs wnl  - continue home dose of Synthroid 75 mcg     History of colon cancer   Assessment & Plan    - S/P resectional surgery x 3  - currently no GI complaints  - no surveillance indicated at present     Blind right eye   Assessment & Plan    - unclear etiology per caregiver  - complete loss of vision  - left eye retains vision but has cataract per hx    Plan  - fall precautions     Bell palsy   Assessment & Plan    - 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, states secondary to radiation  - pt unable to wrinkle forehead     Hearing impairment- (present on admission)   Assessment & Plan    - 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

## 2018-11-06 NOTE — DISCHARGE PLANNING
Received Choice form at 1245  Agency/Facility Name: [facility]  Referral sent per Choice form @ 1246     @8604  Agency/Facility Name: [facility]  Spoke To: Nafisa  Outcome: Referral under review.    @9752  Agency/Facility Name: [facility]  Spoke To: Nafisa  Outcome: Referral under review.    @6935  Agency/Facility Name: [facility]  Outcome: Patient accepted.    MARISEL Lassiter informed.

## 2018-11-06 NOTE — THERAPY
"Speech Language Therapy Evaluation completed to address speech and cognition  Functional Status:  Patient unable to hear 2/2 \"tumor.\"(reports no hearing aid or amplification device will work. All questions and directions were in written format. She was unable to comprehend lengthy or more complex instructions. Initially, she stated she never fell but bent over to pick something up and \"landed\" on her shoulder but recognized that she was on the floor for 14 hrs. Then stated \"I don't know why I'm here. Call Josh ( RN).\" She was unable to provide any solution to not falling or bending over again. She reported that she forgot to pay her phone bill and could not problem solve how to fix this. She also perseverated on Johana (her night nurse) needing to call Josh. Anytime she could not answer or maybe not understand a question or direction her response was \"Johana knows. She needs to call Josh.\" Visual memory was moderately impaired.   Recommendations:  Currently, this patient does not appear to be safe for independent living at this time. Please also see PT and OT notes regarding patient's functional insight and safety awareness.  Plan of Care: Will benefit from Speech Therapy 3 times per week  Post-Acute Therapy: Recommend inpatient transitional care services for continued speech therapy services. Please consider physiatry (PM&R) consult.       See \"Rehab Therapy-Acute\" Patient Summary Report for complete documentation.   "

## 2018-11-06 NOTE — THERAPY
"Physical Therapy Treatment completed.   Bed Mobility:  Supine to Sit:  (up in bathroom at start)  Transfers: Sit to Stand: Supervised  Gait: Level Of Assist: Contact Guard Assist with Front-Wheel Walker       Plan of Care: Plan to complete next treatment by 11/8  Discharge Recommendations: Equipment: No Equipment Needed. (already owns FWW, SPC)  Pt with ongoing lack of attention to need to use FWW, scattered in her comments, and with decreased endurance for ambulation, unable to attempt stairs due to fatigue. Pt will need a post acute transitional care stay before home. Pt would be unsafe at home alone, has 9 stairs to enter her place.      See \"Rehab Therapy-Acute\" Patient Summary Report for complete documentation.       "

## 2018-11-06 NOTE — CARE PLAN
Problem: Knowledge Deficit  Goal: Knowledge of disease process/condition, treatment plan, diagnostic tests, and medications will improve  Outcome: PROGRESSING AS EXPECTED  Educated pt on POC which includes discharge placement, lovenox and coumadin therapy, PT, vs, tele monitoring. Pt verbalized understanding.     Problem: Mobility  Goal: Risk for activity intolerance will decrease  Outcome: PROGRESSING AS EXPECTED  Pt is ambulating x1 assist with a FWW. Pt denies any difficulty with mobility at this time. PT is following pt.

## 2018-11-07 LAB
INR PPP: 1.17 (ref 0.87–1.13)
PROTHROMBIN TIME: 15 SEC (ref 12–14.6)

## 2018-11-07 PROCEDURE — A9270 NON-COVERED ITEM OR SERVICE: HCPCS | Performed by: STUDENT IN AN ORGANIZED HEALTH CARE EDUCATION/TRAINING PROGRAM

## 2018-11-07 PROCEDURE — 700102 HCHG RX REV CODE 250 W/ 637 OVERRIDE(OP): Performed by: STUDENT IN AN ORGANIZED HEALTH CARE EDUCATION/TRAINING PROGRAM

## 2018-11-07 PROCEDURE — 99232 SBSQ HOSP IP/OBS MODERATE 35: CPT | Mod: GC | Performed by: INTERNAL MEDICINE

## 2018-11-07 PROCEDURE — 85610 PROTHROMBIN TIME: CPT

## 2018-11-07 PROCEDURE — 770006 HCHG ROOM/CARE - MED/SURG/GYN SEMI*

## 2018-11-07 PROCEDURE — 700111 HCHG RX REV CODE 636 W/ 250 OVERRIDE (IP): Performed by: STUDENT IN AN ORGANIZED HEALTH CARE EDUCATION/TRAINING PROGRAM

## 2018-11-07 PROCEDURE — 36415 COLL VENOUS BLD VENIPUNCTURE: CPT

## 2018-11-07 RX ORDER — LISINOPRIL 5 MG/1
2.5 TABLET ORAL
Status: DISCONTINUED | OUTPATIENT
Start: 2018-11-07 | End: 2018-11-08 | Stop reason: HOSPADM

## 2018-11-07 RX ADMIN — ENOXAPARIN SODIUM 40 MG: 100 INJECTION SUBCUTANEOUS at 06:00

## 2018-11-07 RX ADMIN — WARFARIN SODIUM 5 MG: 5 TABLET ORAL at 16:51

## 2018-11-07 RX ADMIN — POLYVINYL ALCOHOL 1 DROP: 14 SOLUTION/ DROPS OPHTHALMIC at 16:50

## 2018-11-07 RX ADMIN — TRAMADOL HYDROCHLORIDE 50 MG: 50 TABLET, FILM COATED ORAL at 21:29

## 2018-11-07 RX ADMIN — LISINOPRIL 2.5 MG: 5 TABLET ORAL at 11:58

## 2018-11-07 RX ADMIN — ASPIRIN 81 MG: 81 TABLET, CHEWABLE ORAL at 06:15

## 2018-11-07 RX ADMIN — LEVOTHYROXINE SODIUM 75 MCG: 75 TABLET ORAL at 04:40

## 2018-11-07 RX ADMIN — TRAMADOL HYDROCHLORIDE 50 MG: 50 TABLET, FILM COATED ORAL at 11:58

## 2018-11-07 RX ADMIN — STANDARDIZED SENNA CONCENTRATE AND DOCUSATE SODIUM 2 TABLET: 8.6; 5 TABLET, FILM COATED ORAL at 16:50

## 2018-11-07 RX ADMIN — METOPROLOL TARTRATE 37.5 MG: 25 TABLET, FILM COATED ORAL at 06:15

## 2018-11-07 RX ADMIN — POLYVINYL ALCOHOL 1 DROP: 14 SOLUTION/ DROPS OPHTHALMIC at 06:15

## 2018-11-07 RX ADMIN — STANDARDIZED SENNA CONCENTRATE AND DOCUSATE SODIUM 2 TABLET: 8.6; 5 TABLET, FILM COATED ORAL at 06:20

## 2018-11-07 RX ADMIN — POLYETHYLENE GLYCOL 3350 1 PACKET: 17 POWDER, FOR SOLUTION ORAL at 16:48

## 2018-11-07 RX ADMIN — METOPROLOL TARTRATE 37.5 MG: 25 TABLET, FILM COATED ORAL at 16:50

## 2018-11-07 ASSESSMENT — ENCOUNTER SYMPTOMS
CHILLS: 0
LOSS OF CONSCIOUSNESS: 0
INSOMNIA: 0
PALPITATIONS: 0
DIZZINESS: 0
SENSORY CHANGE: 0
VOMITING: 0
COUGH: 0
SPEECH CHANGE: 0
DEPRESSION: 0
BRUISES/BLEEDS EASILY: 0
FEVER: 0
WEIGHT LOSS: 0
DOUBLE VISION: 0
FOCAL WEAKNESS: 0
SPUTUM PRODUCTION: 0
TINGLING: 0
HALLUCINATIONS: 0
SORE THROAT: 0
SEIZURES: 0
SINUS PAIN: 0
SHORTNESS OF BREATH: 0
NERVOUS/ANXIOUS: 0
NAUSEA: 0
HEADACHES: 0
DIARRHEA: 0
CONSTIPATION: 0
MYALGIAS: 0
TREMORS: 0
EYE DISCHARGE: 0
ORTHOPNEA: 0
EYE REDNESS: 0
ABDOMINAL PAIN: 0
EYE PAIN: 0
BACK PAIN: 0

## 2018-11-07 ASSESSMENT — PAIN SCALES - GENERAL
PAINLEVEL_OUTOF10: 6
PAINLEVEL_OUTOF10: 4
PAINLEVEL_OUTOF10: 7
PAINLEVEL_OUTOF10: 2
PAINLEVEL_OUTOF10: 0

## 2018-11-07 ASSESSMENT — LIFESTYLE VARIABLES: SUBSTANCE_ABUSE: 0

## 2018-11-07 NOTE — NON-PROVIDER
Internal Medicine Interval Note  Note Author: Janette Arango, Student     Name Cris Pham 1934   Age/Sex 84 y.o. female   MRN [id number]   Code Status FULL     After 5PM or if no immediate response to page, please call for cross-coverage  Attending/Team: Dr. Cruz / Salena See Patient List for primary contact information  Call [phone] to page    1st Call - Day Intern (R1):   Dr. Moctezuma 2nd Call - Day Sr. Resident (R2/R3):   Dr. Rouse         Reason for interval visit  (Principal Problem)   Fall from ground level    Interval Problem Daily Status Update  (24 hours)   Pt has no complaints today. Denies any HA. Remains in sinus rhythm. VSS.    Review of Systems   Constitutional: Negative for fever.   Respiratory: Negative for cough and shortness of breath.    Cardiovascular: Negative for chest pain.   Gastrointestinal: Negative for abdominal pain.   Neurological: Negative for headaches.       Quality Measures  Quality-Core Measures   DVT prophylaxis - mechanical:  SCDs          Physical Exam       Vitals:    18 0000 18 0320 18 0744 18 1200   BP: 128/79 111/79 123/75 118/70   Pulse: 71 96 94 91   Resp:    Temp: 36.5 °C (97.7 °F) 35.9 °C (96.6 °F) 36.9 °C (98.5 °F) 37.3 °C (99.1 °F)   SpO2: 92% 92% 91% 93%   Weight: 68.9 kg (151 lb 14.4 oz)      Height:         Body mass index is 25.28 kg/m². Weight: 68.9 kg (151 lb 14.4 oz)  Oxygen Therapy:  Pulse Oximetry: 93 %, O2 (LPM): 0, O2 Delivery: None (Room Air)    Physical Exam   Constitutional: She is well-developed, well-nourished, and in no distress.   HENT:   R eye patch.  Chronic right sided ptosis and facial droop.   Neck: Neck supple.   Cardiovascular: Normal rate, regular rhythm and normal heart sounds.    Pulmonary/Chest: Breath sounds normal. No respiratory distress.   Abdominal: Soft. There is no tenderness.   Neurological: She is alert.   Skin: Skin is warm.         Lab Data Review:   Labs:  Third day  on Warfarin; PT: 15.0 INR: 1.17      Assessment/Plan   85 y/o female with history of meningioma, chronic Bell's palsy admitted for fall with associated LOC. In ER, had increased meningioma size and Afib with RVR and was subsequently admitted for further evaluation.      1) Fall from ground level  - Pt sustained a fall from ground level after knees buckled and c/o pain in buttocks and right knee joint.  - No LOC or evidence of head injury. No knee joint tenderness, bony deformities, edema on physical exam.  Normal ROM in knee.  Pt alert on arrival.  - Ddx includes mechanical fall, CVA, hypogycemia and hypovolemia.    - No new focal neuro weakness, blood glucose was 92 on arrival, no evidence of dehydration on chem panel or physical exam. Pt has hx of Chronic Bell's Palsy secondary to her meningioma. Mechanical fall most likely etiology; hx of being unsteady on feet.    - Pt no longer complaining of pain. No evidence to suggest mass effect from preexisting tumor, seizure, CVA, bony injury.  - Pt stable for d/c.   - PT/OT evaluated and released to SNF.  - Pt lives at the [facility] in [place], no supervision at home. Due to new warfarin dosing and recent fall, will be discharged to SNF for further monitoring.   - Arrangements being made to discharge patient to [facility].      2) Paroxysmal atrial fibrillation  - Pt has history of atrial fibrillation with RVR. Has remained on telemetry since admission.  - Pt has remained in sinus rhythm on telemetry.  Echo showed no evidence of heart failure. Pt has no complaints of palpitations and has not experienced any syncopal symptoms since admission.   - Pt currently on Metoprolol, Warfarin and ASA for a fib.        - Metoprolol dose increased from 25mg to 37.5mg PO. Pt has                     remained in NSR.          - Continue ASA 81mg PO.          - Started on Warfarin 5mg PO QD for anticoagulation. INR is 1.17.  - No indication for further work up at this  time.   - D/c telemetry monitoring.      3) Meningioma  - Pt's meningioma has grown in size with midline shift.  - Pt has declined elective surgical treatment of meningioma.  - Neurosurgery consulted and stated only need to re-consult in event of new focal neuro deficits or acute changes in mentation.     4) Right Eye Ptosis  - Pt provided with eye patch and artificial tear drops for her right eye due to dryness secondary to chronic ptosis. Pt developed chronic Bell's Palsy due to meningoma.      5) Other Chronic Medical Conditions  - Continue home treatment.

## 2018-11-07 NOTE — PROGRESS NOTES
Bedside report with KRISTY Angel, pt is alert and oriented on room air, eye patch over R eye, call bell within reach and bed alarm precautions in place for moderate fall risk. Pt has no questions at this time, will continue to monitor.

## 2018-11-07 NOTE — PROGRESS NOTES
Inpatient Anticoagulation Service Note    Date: 11/7/2018  Reason for Anticoagulation: Atrial Fibrillation, Bioprosthetic Valve Replacement   [id number] VASc Score: 5    Hemoglobin Value: 13.4  Hematocrit Value: 39.3  Lab Platelet Value: 181  Target INR: 2.0 to 3.0    INR from last 7 days     Date/Time INR Value    11/07/18 0316 (!)  1.17    11/06/18 0244  1.04    11/05/18 1319  1.01    11/02/18 0833  1.09        Dose from last 7 days     Date/Time Dose (mg)    11/07/18 0700  5    11/06/18 1500  5    11/05/18 1600  5        Average Dose (mg): New Start Warfarin  Significant Interactions: Aspirin, Thyroid Medications  Bridge Therapy: No  Reversal Agent Administered: Not Applicable  Comments: NNLs today. No reports of bleeding or issues in regards to warfarin therapy. No new DDIs. Will continue with warfarin 5 mg PO daily and enoxaparin 40 mg subQ daily until INR therapeutic. Will continue to monitor.    Plan:  Warfarin 5 mg PO daily  Education Material Provided?: No  Pharmacist suggested discharge dosing: Warfarin 5 mg PO daily with follow up appointment at anticoagulation clinic within 48-72 hours.     Paige Miller, PharmD

## 2018-11-07 NOTE — CARE PLAN
Problem: Discharge Barriers/Planning  Goal: Patient's continuum of care needs will be met  Outcome: PROGRESSING AS EXPECTED  [facility] 11[facility]18. Pt verbalized understanding.     Problem: Mobility  Goal: Risk for activity intolerance will decrease  Outcome: PROGRESSING AS EXPECTED  Pt is ambulating well with x1 assist with FWW. Will continue to monitor.

## 2018-11-07 NOTE — DISCHARGE PLANNING
Agency/Facility Name: [facility]  Spoke To: Mckenna  Outcome: Requesting Diet update, chest x-ray, and D/C summary.    Agency/Facility Name: [facility]  Spoke To: Mckenna  Outcome: Gave all information they requested that we could provide. Requested for 5pm transport.    [facility]  Spoke To: Vicki  Outcome: Unable to transport today, earliest tomorrow at 1100.    Received Transport Form @ 1100  Spoke to Vicki @ [facility]    Transport is scheduled for 11/8 @1100 going to [facility].    Approved Services for $230 sent to [facility].    MARISEL Lassiter informed.

## 2018-11-07 NOTE — PROGRESS NOTES
"Per pt request informed Josh ([phone]) of patients pending transfer to [facility] today. Josh states \"I will go to pt's room at the [facility] and get clothing and belongings for her. I will also bring pt her check book and help her pay rent at the [facility].\" Pt was updated on information received from Josh.   "

## 2018-11-07 NOTE — PROGRESS NOTES
Internal Medicine Interval Note  Note Author: Catarino Moctezuma M.D.     Name Cris Pham 1934   Age/Sex 84 y.o. female   MRN [id number]   Code Status DNAR/DNI     After 5PM or if no immediate response to page, please call for cross-coverage  Attending/Team: Dr Cruz/ Salena See Patient List for primary contact information  Call [phone] to page    1st Call - Day Intern (R1):   Dr Moctezuma 2nd Call - Day Sr. Resident (R2/R3):   Dr Rouse       Reason for interval visit  (Principal Problem)   Atrial fibrillation  Meningioma  B/l profound hearing loss  GLF    Interval Problem Daily Status Update  (24 hours, problem oriented, brief subjective history, new lab/imaging data pertinent to that problem)     - Pt seen and examined at bedside this AM, stable and NAD; no AEON, no events on tele.  - no chest symptoms, no new complaints  - Metoprolol to 37.5 mg BID, currently rate and pressure well controlled  - Warfarin to continue as outpatient, [id number] score 3  - off tele  - bandage over right eye in view of excessive dryness from exposure  - pt to be discharged to [facility] tomorrow AM  - medically clear for discharge    - Josh De Leon - [phone] for further correspondence - he is her former home health nurse - he has agreed to co-ordinate her care and bring in her personal belongings once she is in [place]      Review of Systems   Constitutional: Negative for chills, fever and weight loss.   HENT: Positive for hearing loss. Negative for congestion, sinus pain and sore throat.    Eyes: Negative for double vision, pain, discharge and redness.        Blindness in right eye     Respiratory: Negative for cough, sputum production and shortness of breath.    Cardiovascular: Negative for chest pain, palpitations, orthopnea and leg swelling.   Gastrointestinal: Negative for abdominal pain, constipation, diarrhea, nausea and vomiting.   Genitourinary: Negative for dysuria, frequency and urgency.    Musculoskeletal: Negative for back pain, joint pain and myalgias.        Pain in right shoulder and upper back   Skin: Negative for itching and rash.   Neurological: Negative for dizziness, tingling, tremors, sensory change, speech change, focal weakness, seizures, loss of consciousness and headaches.   Endo/Heme/Allergies: Negative for environmental allergies. Does not bruise/bleed easily.   Psychiatric/Behavioral: Negative for depression, hallucinations, substance abuse and suicidal ideas. The patient is not nervous/anxious and does not have insomnia.        Disposition/Barriers to discharge:   To SNF    Consultants/Specialty  Dr Esmer KIRKLAND - Neurosurgery  Dr Adán KIRKLAND - Cardiology  PCP: Pcp Unobtainable By       Quality Measures  Quality-Core Measures   Reviewed items::  Labs reviewed, Medications reviewed and Radiology images reviewed  Paul catheter::  No Paul  DVT prophylaxis pharmacological::  Enoxaparin (Lovenox)  DVT prophylaxis - mechanical:  SCDs  Ulcer Prophylaxis::  No          Physical Exam       Vitals:    11/07/18 0000 11/07/18 0320 11/07/18 0744 11/07/18 1200   BP: 128/79 111/79 123/75 118/70   Pulse: 71 96 94 91   Resp: 18 18 18 18   Temp: 36.5 °C (97.7 °F) 35.9 °C (96.6 °F) 36.9 °C (98.5 °F) 37.3 °C (99.1 °F)   SpO2: 92% 92% 91% 93%   Weight: 68.9 kg (151 lb 14.4 oz)      Height:         Body mass index is 25.28 kg/m². Weight: 68.9 kg (151 lb 14.4 oz)  Oxygen Therapy:  Pulse Oximetry: 93 %, O2 (LPM): 0, O2 Delivery: None (Room Air)    Physical Exam   Constitutional: She is oriented to person, place, and time and well-developed, well-nourished, and in no distress. No distress.   HENT:   Head: Normocephalic and atraumatic.   Nose: Nose normal.   Mouth/Throat: No oropharyngeal exudate.   Eyes: Pupils are equal, round, and reactive to light. Conjunctivae and EOM are normal. Right eye exhibits no discharge. Left eye exhibits no discharge.   Right sided ptosis   Neck: Normal range of motion.  Neck supple.   Cardiovascular: Normal rate, regular rhythm and normal heart sounds.  Exam reveals no friction rub.    No murmur heard.  Pulmonary/Chest: Effort normal. No respiratory distress. She has no wheezes. She has no rales.   Abdominal: Soft. She exhibits no distension. There is no tenderness. There is no guarding.   Genitourinary:   Genitourinary Comments: declined   Musculoskeletal: She exhibits no edema or deformity.   Neurological: She is alert and oriented to person, place, and time.   Skin: Skin is warm and dry. No rash noted. She is not diaphoretic. No erythema.   Psychiatric: Mood and affect normal.   Nursing note and vitals reviewed.            Assessment/Plan     * Fall from ground level   Assessment & Plan    - no sequelae from fall, no dizziness  - no hx of instability on feet or prior falls  - risks of not anticoagulating greater than risks of bleed from potential future falls    Plan  - aspiration, seizure and fall precautions  - Warfarin       Prosthetic mitral valve stenosis   Assessment & Plan    No evidence of heart failure   Continue to control HR, increase metoprolol dose  Resume AC  Lisinopril 2.5 mg Qdaily     Paroxysmal atrial fibrillation (HCC)   Assessment & Plan    - pt asymptomatic  - no pacemaker in situ  - likely secondary to chronic MR  - currently in sinus rhythm  - CHADSVASC of 3  - Echo: significant gradient across mitral valve concern for MS per Cardiology - rec Warfarin and MATI as outpatient  - currently in sinus rthythm    Plan  - discontinue telemetry  - Warfarin per pharmacy protocol  - metoprolol 37.5 BID in view of hypertension and tachycardia     Meningioma (HCC)   Assessment & Plan    - diagnosed 2012, managed conservatively and with radiation  - 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 bone  - SNHL, no current tinnitus  - erosion of petrous bone, midline shift and vasogenic edema, rapid increase in size of lesion  - asymptomatic  - pt refuses treatment of meningioma  -  Neurosurgery recommends discontinuation of Keppra and Decadron as no evidence of raised ICP  - if acute changes in mentation or neurological status can re consult neurosurgery       Hypothyroidism   Assessment & Plan    - TFTs wnl  - continue home dose of Synthroid 75 mcg     History of colon cancer   Assessment & Plan    - S/P resectional surgery x 3  - currently no GI complaints  - no surveillance indicated at present     Blind right eye   Assessment & Plan    - unclear etiology per caregiver  - complete loss of vision  - left eye retains vision but has cataract per hx    Plan  - fall precautions  - eye drops for lubrication  - eye patch     Bell palsy   Assessment & Plan    - 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, 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

## 2018-11-07 NOTE — NON-PROVIDER
Internal Medicine Medical Student Note  Note Author: Janette Arango, Student    Name Cris Pham 1934   Age/Sex 84 y.o. female   MRN [id number]   Code Status DNAR/DNI     After 5PM or if no immediate response to page, please call for cross-coverage  Attending/Team: Dr. Cruz / Salena See Patient List for primary contact information  Call [phone] to page after hours   1st Call - Day Intern (R1):   Dr. Moctezuma 2nd Call - Day Sr. Resident (R2/R3):   Dr. Rouse         Reason for interval visit  (Principal Problem)   Fall from ground level    Interval Problem Daily Status Update  (24 hours)   Pt complains of right eye dryness. No other complaints. Denies any pain, HA. No new weakness.    Pt is profoundly deaf; can only communicate with her via writing.     Review of Systems   Constitutional: Negative for fever.   Eyes: Positive for redness. Negative for discharge.   Respiratory: Negative for cough and shortness of breath.    Cardiovascular: Negative for chest pain.   Gastrointestinal: Negative for vomiting.   Neurological: Negative for focal weakness and headaches.               Physical Exam       Vitals:    18 0000 18 0400 18 0800 18 1300   BP: 143/90 148/89 145/86 133/76   Pulse: 100 99 93 95   Resp: 16 16 17 16   Temp: 36.7 °C (98 °F) 36.6 °C (97.8 °F) 36.8 °C (98.2 °F) 37 °C (98.6 °F)   SpO2: 90% 92% 91% 91%   Weight:       Height:         Body mass index is 25.5 kg/m². Weight: 69.5 kg (153 lb 3.5 oz)  Oxygen Therapy:  Pulse Oximetry: 91 %, O2 (LPM): 0, O2 Delivery: None (Room Air)    Physical Exam   Constitutional: She is well-developed, well-nourished, and in no distress.   Neck: Neck supple.   Cardiovascular: Normal rate, regular rhythm and normal heart sounds.    Pulmonary/Chest: Effort normal and breath sounds normal. No respiratory distress.   Abdominal: Soft. There is no tenderness.   Neurological: She is alert.   Chronic right sided Bell's palsy - pstosis  and facial droop   Skin: Skin is warm and dry.             Assessment/Plan   83 y/o female with history of meningioma, chronic Bell's palsy admitted for fall with associated LOC. In ER, had increased meningioma size and Afib with RVR and was subsequently admitted for further evaluation.     1) Fall from ground level  - Pt no longer complaining of pain.   - No evidence to suggest mass effect from preexisting tumor, seizure, CVA, bony injury.  - PT/OT evaluated and released to SNF.  - Pt stable for d/c. Arrangements being made to discharge patient to [facility].     2) Prosthetic mitral valve stenosis  - Echo showed no evidence of heart failure.  - Metoprolol being used for rate control.  - Continue ASA 81mg PO.   - No indication for further work up at this time.     3) Paroxysmal atrial fibrillation  - Pt remains asymptomatic and in sinus rhythm.   - Afib likely secondary to chronic mitral regurgitation.  - Started on Warfarin 5mg PO QD for anticoagulation.  - Continue Metoprolol 37.5mg PO BID for rate control.   - Pt lives at the [facility] in [place], no supervision at home. Due to new warfarin dosing and recent fall, will be discharged to SNF for further monitoring.     4) Meningioma  - No new weakness, focal neuro deficits, acute changes in mentation.  - Pt has declined surgical treatment of meningioma in past.  - No indication for further neurosurgery consults.     5) Right Eye Ptosis  - Pt provided with bandage and artificial tear drops for her right eye due to dryness secondary to chronic ptosis. Pt developed chronic Bell's Palsy due to meningoma.     5) Other Chronic Medical Conditions  - Continue home treatment.

## 2018-11-07 NOTE — DISCHARGE PLANNING
Anticipated Discharge Disposition: SNF    Action: LSW informed Pt has been accepted to [facility] in [place], [place]. LSW request bedside RN request update about medical clearance during morning rounding with [facility].     LSW informed Josh (Pt's caregiver) has been in communication with [employer] regarding moving her apartment to ground level and accessing her belongings to be able to pay rent and access cell phone. Josh request call back regarding Pt discharge date to [facility].     Barriers to Discharge: Medical clearance     Plan: Pt to discharge to [facility] when medically cleared

## 2018-11-07 NOTE — DISCHARGE SUMMARY
Internal Medicine Discharge Summary  Note Author: Catarino Moctezuma M.D.       Name Cris Pham 1934   Age/Sex 84 y.o. female   MRN [id number]         Admit Date:  2018       Discharge Date:   18    Service:   [facility] Internal Medicine Yellow Team  Attending Physician(s):   Dr Cruz       Senior Resident(s):   Dr Rouse  [place] Resident(s):   Dr Moctezuma  PCP: Pcp Unobtainable By       Primary Diagnosis:   Meningioma  Ground level fall  Bilateral profound hearing loss    Secondary Diagnoses:                Principal Problem:    Fall from ground level POA: Yes  Active Problems:    Meningioma (HCC) (Chronic) POA: Yes    Paroxysmal atrial fibrillation (HCC) POA: Yes    Prosthetic mitral valve stenosis POA: Yes      Overview: Echo 11/3/2018: EF 45%, Mean Transvalvular gradient 13     Mitral regurgitation POA: Yes    Hearing impairment POA: Yes    Bell palsy (Chronic) POA: Yes    Blind right eye POA: Yes    History of colon cancer (Chronic) POA: Yes    Hypothyroidism (Chronic) POA: Yes  Resolved Problems:    Hyperbilirubinemia POA: Unknown      Hospital Summary (Brief Narrative):       This pleasant 84 year old female patient with past medical history of right temporal lobe meningioma diagnosed in  and treated with radiation per history, repaired mitral regurgitation with prosthetic valve in situ, atrial fibrillation not on anticoagulation, profound bilateral deafness and unilateral right eye blindness, and surgically managed remote colon cancer and hypothyroidism on medical management had a mechanical ground level fall without head trauma or loss of consciousness at her room at the [facility] in [place] where she lives alone and was unable to move. She altered the neighbors who took her to [facility] where she underwent a head CT which showed a large increase in the size of her meningioma. She was transferred to [facility] for further management and Keppra and Decadron were  started. Neurosurgery was consulted but the patient did not wasn't any treatment for the tumor and so Keppra and Decadron were discontinued. She had no signs of increased intracranial pressure. She remains blind in her right eye with ptosis and eversion of her lower eyelid and deaf in both ears and so communicating with her is only possible via writing. She has no living relatives or next of kin or close friends, except for her former home health nurse Mr Josh De Leon with whom we communicated regarding her baseline at home and the whereabouts of her belongings.   She was found to be in atrial fibrillation once even going into rapid ventricular rhythm and so was started on Warfarin. Echo revealed a mitral stenosis of her prosthetic valve and she has been started on Lisinopril . She has also been started on Metoprolol in view of hypertension. She continues to decline treatment of her meningioma. She is to be discharged in a skilled nursing facility per her physical therapy evaluation with eventual disposition to home with follow up for continuation of her Warfarin, Metoprolol and Lisinopril.  Josh De Leon will be bringing her her belongings per our discussion over the phone.        Patient /Hospital Summary (Details -- Problem Oriented) :          Prosthetic mitral valve stenosis   Assessment & Plan    No evidence of heart failure   Continue to control HR, increase metoprolol dose  Resume AC  Lisinopril 2.5 mg Qdaily     Paroxysmal atrial fibrillation (HCC)   Assessment & Plan    - pt asymptomatic  - no pacemaker in situ  - likely secondary to chronic MR  - currently in sinus rhythm  - CHADSVASC of 3  - Echo: significant gradient across mitral valve concern for MS per Cardiology - rec Warfarin and MATI as outpatient  - currently in sinus rthythm    Plan  - Warfarin to continue, dose adjustment needed per INR  - metoprolol 37.5 BID     Meningioma (HCC)   Assessment & Plan    - diagnosed 2012, managed conservatively and  with radiation  - 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 bone  - SNHL, no current tinnitus  - erosion of petrous bone, midline shift and vasogenic edema, rapid increase in size of lesion  - asymptomatic  - pt refuses treatment of meningioma  - discontinuation of Keppra and Decadron as no evidence of raised ICP  - if acute changes in mentation or neurological status can re consult neurosurgery       * Fall from ground level   Assessment & Plan    - no sequelae from fall, no dizziness  - no hx of instability on feet or prior falls  - risks of not anticoagulating greater than risks of bleed from potential future falls    Plan  - aspiration, seizure and fall precautions  - Warfarin       Hypothyroidism   Assessment & Plan    - TFTs wnl  - continue home dose of Synthroid 75 mcg     History of colon cancer   Assessment & Plan    - S/P resectional surgery x 3  - currently no GI complaints  - no surveillance indicated at present     Blind right eye   Assessment & Plan    - unclear etiology per caregiver  - complete loss of vision  - left eye retains vision but has cataract per hx    Plan  - fall precautions  - eye drops for lubrication  - eye patch     Bell palsy   Assessment & Plan    - 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, states secondary to radiation  - pt unable to wrinkle forehead     Hearing impairment   Assessment & Plan    - profound b/l hearing loss secondary to radiation for her meningioma per caregiver hx  - able to understand written words in print  - oriented x 4, MMSE WNL, expresses understanding, capacity intact     Mitral regurgitation   Assessment & Plan    - S/P MVR  - Afib with paced rhythm secondary to longstanding MR, currently no RVR  - MS of bioprosthetic valve on Echo  - Warfarin to continue  - Lisinopril at 2.5 mg PO qdaily  - outpatient BMP in 1 week     Hyperbilirubinemia-resolved as of 11/3/2018   Assessment & Plan    - elevated indirect bilirubin  - possibly secondary to chronic  hemolysis from prosthetic mitral valve  - CTM         Consultants:     Dr Esmer KIRKLAND - Neurosurgery  Dr Chong Mccain, Dr Shreya Mccain - Cardiology    Procedures:        none    Imaging/ Testin/03/18 - Echo:  Compared to the images of the prior study done 12, significant   changes noted.  Ejection fraction is 45%.  Mild global hypokinesis.  Mitral valve prosthesis with significant gradient across the prosthetic   valve.  Recommend further evaluation with transesophageal echo if   clinically indicated.    Discharge Medications:         Medication Reconciliation: Completed       Medication List      START taking these medications      Instructions   aspirin 81 MG Chew chewable tablet  Start taking on:  2018  Commonly known as:  ASA   Take 1 Tab by mouth every day.  Dose:  81 mg     lisinopril 2.5 MG Tabs  Start taking on:  2018  Commonly known as:  PRINIVIL   Take 1 Tab by mouth every day.  Dose:  2.5 mg     Metoprolol Tartrate 37.5 MG Tabs   Take 37.5 mg by mouth 2 Times a Day.  Dose:  37.5 mg     warfarin 5 MG Tabs  Commonly known as:  COUMADIN   Take 1 Tab by mouth COUMADIN-DAILY.  Dose:  5 mg        CONTINUE taking these medications      Instructions   ibuprofen 200 MG Tabs  Commonly known as:  MOTRIN   Take 200 mg by mouth every 6 hours as needed.  Dose:  200 mg     levothyroxine 75 MCG Tabs  Commonly known as:  SYNTHROID   Take 75 mcg by mouth Every morning on an empty stomach.  Dose:  75 mcg        STOP taking these medications    artificial tears 1.4 % Soln              Current Outpatient Prescriptions   Medication Sig Dispense Refill   • [START ON 2018] aspirin (ASA) 81 MG Chew Tab chewable tablet Take 1 Tab by mouth every day. 100 Tab 0   • warfarin (COUMADIN) 5 MG Tab Take 1 Tab by mouth COUMADIN-DAILY. 30 Tab 3   • [START ON 2018] lisinopril (PRINIVIL) 2.5 MG Tab Take 1 Tab by mouth every day. 30 Tab 3   • metoprolol 37.5 MG Tab Take 37.5 mg by mouth 2 Times a Day. 60  Tab 1         Disposition:   To [facility]    Diet:   Cardiac diet    Activity:   With walker, with assist when indicated    Instructions:       The patient was instructed to return to the ER in the event of worsening symptoms. I have counseled the patient on the importance of compliance and the patient has agreed to proceed with all medical recommendations and follow up plan indicated above.   The patient understands that all medications come with benefits and risks. Risks may include permanent injury or death and these risks can be minimized with close reassessment and monitoring.        Primary Care Provider:      Discharge summary faxed to primary care provider:  Deferred  Copy of discharge summary given to the patient: Completed      Follow up appointment details :      As needed    Pending Studies:        BMP at SNF in 1 week post discharge as newly started Lisinopril    Time spent on discharge day patient visit, preparing discharge paperwork and arranging for patient follow up.    Summary of follow up issues:     Discharge Time (Minutes) :  60 min  Hospital Course Type:  Inpatient Stay >2 midnights      Condition on Discharge    ______________________________________________________________________    Interval history/exam for day of discharge:    - pt stable, seen and examined at bedside  - sinus rhythm, some PVCs, asymptomatic  - no new complaints, ROS wnl  - Warfarin, Metoprolol, Lisinopril to continue  - Josh De Leon - [phone] for further correspondence - he is her former home health nurse - he has agreed to co-ordinate her care and bring in her personal belongings once she is in [place]    Most Recent Labs:    Lab Results   Component Value Date/Time    WBC 4.9 11/05/2018 04:47 AM    RBC 4.50 11/05/2018 04:47 AM    HEMOGLOBIN 13.4 11/05/2018 04:47 AM    HEMATOCRIT 39.3 11/05/2018 04:47 AM    MCV 87.3 11/05/2018 04:47 AM    MCH 29.8 11/05/2018 04:47 AM    MCHC 34.1 11/05/2018 04:47 AM     MPV 9.7 11/05/2018 04:47 AM    NEUTSPOLYS 65.30 11/05/2018 04:47 AM    LYMPHOCYTES 18.90 (L) 11/05/2018 04:47 AM    MONOCYTES 10.10 11/05/2018 04:47 AM    EOSINOPHILS 4.70 11/05/2018 04:47 AM    BASOPHILS 0.80 11/05/2018 04:47 AM      Lab Results   Component Value Date/Time    SODIUM 137 11/05/2018 04:47 AM    POTASSIUM 3.9 11/05/2018 04:47 AM    CHLORIDE 105 11/05/2018 04:47 AM    CO2 28 11/05/2018 04:47 AM    GLUCOSE 100 (H) 11/05/2018 04:47 AM    BUN 17 11/05/2018 04:47 AM    CREATININE 0.71 11/05/2018 04:47 AM      Lab Results   Component Value Date/Time    ALTSGPT 12 11/03/2018 03:06 AM    ASTSGOT 18 11/03/2018 03:06 AM    ALKPHOSPHAT 50 11/03/2018 03:06 AM    TBILIRUBIN 0.8 11/03/2018 03:06 AM    ALBUMIN 3.5 11/03/2018 03:06 AM    GLOBULIN 1.8 (L) 11/03/2018 03:06 AM    INR 1.44 (H) 11/08/2018 02:29 AM     Lab Results   Component Value Date/Time    PROTHROMBTM 17.6 (H) 11/08/2018 02:29 AM    INR 1.44 (H) 11/08/2018 02:29 AM

## 2018-11-07 NOTE — DISCHARGE PLANNING
Anticipated Discharge Disposition: SNF     Action: LISAW completed approved service for [employer] (230.00) to ensure transportation can be setup for 11am to [facility].    Barriers to Discharge: None     Plan: Pt to discharge to [facility] tomorrow at 11am

## 2018-11-08 VITALS
BODY MASS INDEX: 25.53 KG/M2 | HEART RATE: 77 BPM | SYSTOLIC BLOOD PRESSURE: 119 MMHG | OXYGEN SATURATION: 91 % | DIASTOLIC BLOOD PRESSURE: 70 MMHG | TEMPERATURE: 97.1 F | WEIGHT: 153.22 LBS | RESPIRATION RATE: 18 BRPM | HEIGHT: 65 IN

## 2018-11-08 LAB
INR PPP: 1.44 (ref 0.87–1.13)
PROTHROMBIN TIME: 17.6 SEC (ref 12–14.6)

## 2018-11-08 PROCEDURE — 85610 PROTHROMBIN TIME: CPT

## 2018-11-08 PROCEDURE — 700102 HCHG RX REV CODE 250 W/ 637 OVERRIDE(OP): Performed by: STUDENT IN AN ORGANIZED HEALTH CARE EDUCATION/TRAINING PROGRAM

## 2018-11-08 PROCEDURE — A9270 NON-COVERED ITEM OR SERVICE: HCPCS | Performed by: STUDENT IN AN ORGANIZED HEALTH CARE EDUCATION/TRAINING PROGRAM

## 2018-11-08 PROCEDURE — 99238 HOSP IP/OBS DSCHRG MGMT 30/<: CPT | Mod: GC | Performed by: INTERNAL MEDICINE

## 2018-11-08 PROCEDURE — 700111 HCHG RX REV CODE 636 W/ 250 OVERRIDE (IP): Performed by: STUDENT IN AN ORGANIZED HEALTH CARE EDUCATION/TRAINING PROGRAM

## 2018-11-08 PROCEDURE — 36415 COLL VENOUS BLD VENIPUNCTURE: CPT

## 2018-11-08 RX ORDER — LISINOPRIL 2.5 MG/1
2.5 TABLET ORAL DAILY
Qty: 30 TAB | Refills: 3 | Status: SHIPPED | OUTPATIENT
Start: 2018-11-09

## 2018-11-08 RX ORDER — WARFARIN SODIUM 5 MG/1
5 TABLET ORAL
Qty: 30 TAB | Refills: 3 | Status: SHIPPED | OUTPATIENT
Start: 2018-11-08

## 2018-11-08 RX ORDER — ASPIRIN 81 MG/1
81 TABLET, CHEWABLE ORAL DAILY
Qty: 100 TAB | Refills: 0 | Status: SHIPPED | OUTPATIENT
Start: 2018-11-09

## 2018-11-08 RX ORDER — METOPROLOL TARTRATE 37.5 MG/1
37.5 TABLET, FILM COATED ORAL 2 TIMES DAILY
Qty: 60 TAB | Refills: 1 | Status: SHIPPED | OUTPATIENT
Start: 2018-11-08

## 2018-11-08 RX ADMIN — LISINOPRIL 2.5 MG: 5 TABLET ORAL at 06:39

## 2018-11-08 RX ADMIN — POLYVINYL ALCOHOL 1 DROP: 14 SOLUTION/ DROPS OPHTHALMIC at 06:37

## 2018-11-08 RX ADMIN — METOPROLOL TARTRATE 37.5 MG: 25 TABLET, FILM COATED ORAL at 06:34

## 2018-11-08 RX ADMIN — TRAMADOL HYDROCHLORIDE 50 MG: 50 TABLET, FILM COATED ORAL at 06:34

## 2018-11-08 RX ADMIN — LEVOTHYROXINE SODIUM 75 MCG: 75 TABLET ORAL at 06:37

## 2018-11-08 RX ADMIN — ASPIRIN 81 MG: 81 TABLET, CHEWABLE ORAL at 06:00

## 2018-11-08 RX ADMIN — ENOXAPARIN SODIUM 40 MG: 100 INJECTION SUBCUTANEOUS at 06:39

## 2018-11-08 RX ADMIN — TRAMADOL HYDROCHLORIDE 50 MG: 50 TABLET, FILM COATED ORAL at 06:38

## 2018-11-08 ASSESSMENT — ENCOUNTER SYMPTOMS
EYE DISCHARGE: 0
FOCAL WEAKNESS: 0
SENSORY CHANGE: 0
CHILLS: 0
EYE PAIN: 0
TINGLING: 0
FEVER: 0
INSOMNIA: 0
SORE THROAT: 0
SEIZURES: 0
SPEECH CHANGE: 0
EYE REDNESS: 0
NERVOUS/ANXIOUS: 0
SINUS PAIN: 0
SHORTNESS OF BREATH: 0
SPUTUM PRODUCTION: 0
HALLUCINATIONS: 0
MYALGIAS: 0
CONSTIPATION: 0
TREMORS: 0
NAUSEA: 0
COUGH: 0
DIZZINESS: 0
VOMITING: 0
HEADACHES: 0
ORTHOPNEA: 0
DIARRHEA: 0
LOSS OF CONSCIOUSNESS: 0
BACK PAIN: 0
WEIGHT LOSS: 0
DEPRESSION: 0
PALPITATIONS: 0
BRUISES/BLEEDS EASILY: 0
DOUBLE VISION: 0
ABDOMINAL PAIN: 0

## 2018-11-08 ASSESSMENT — PAIN SCALES - GENERAL: PAINLEVEL_OUTOF10: 6

## 2018-11-08 ASSESSMENT — LIFESTYLE VARIABLES: SUBSTANCE_ABUSE: 0

## 2018-11-08 NOTE — DISCHARGE PLANNING
Anticipated Discharge Disposition: SNF     Action: LSW paged [facility] Yellow team for the 3 time since 8am regarding missing D/C order/summary.      Barriers to Discharge: D/C summary/order      Plan: Pt to discharge at 11am to [place]

## 2018-11-08 NOTE — CARE PLAN
Problem: Communication  Goal: The ability to communicate needs accurately and effectively will improve  Outcome: PROGRESSING AS EXPECTED  Communicated with client by writing questions for client to read; assessed pain, ensured all needs addressed, and discussed POC.     Problem: Safety  Goal: Will remain free from falls  Outcome: PROGRESSING AS EXPECTED  Patient educated to use call light for assistance. Fall precautions in place. Staff will assist with mobilization. Hourly rounding in place.

## 2018-11-08 NOTE — PROGRESS NOTES
Internal Medicine Interval Note  Note Author: Catarino Moctezuma M.D.     Name Cris Pham 1934   Age/Sex 84 y.o. female   MRN [id number]   Code Status DNAR/DNI     After 5PM or if no immediate response to page, please call for cross-coverage  Attending/Team: Dr Cruz/ Salena See Patient List for primary contact information  Call [phone] to page    1st Call - Day Intern (R1):   Dr Moctezuma 2nd Call - Day Sr. Resident (R2/R3):   Dr Rouse       Reason for interval visit  (Principal Problem)   Atrial fibrillation  Meningioma  B/l profound hearing loss  GLF    Interval Problem Daily Status Update  (24 hours, problem oriented, brief subjective history, new lab/imaging data pertinent to that problem)     - Pt seen and examined at bedside this AM, stable and NAD; no AEON, some PVCs on tele, asymptomatic  - no chest symptoms, no new complaints  - Metoprolol to 37.5 mg BID, currently rate and pressure well controlled  - Warfarin to continue as outpatient, [id number] 3  - bandage over right eye in view of excessive dryness from exposure  - pt to be discharged to SNF today  - medically clear for discharge; BMP in 1 week at SNF in view of newly started Lisinopril    - Josh De Leon - [phone] for further correspondence - he is her former home health nurse - he has agreed to co-ordinate her care and bring in her personal belongings once she is in [place]      Review of Systems   Constitutional: Negative for chills, fever and weight loss.   HENT: Positive for hearing loss. Negative for congestion, sinus pain and sore throat.    Eyes: Negative for double vision, pain, discharge and redness.        Blindness in right eye     Respiratory: Negative for cough, sputum production and shortness of breath.    Cardiovascular: Negative for chest pain, palpitations, orthopnea and leg swelling.   Gastrointestinal: Negative for abdominal pain, constipation, diarrhea, nausea and vomiting.   Genitourinary: Negative  for dysuria, frequency and urgency.   Musculoskeletal: Negative for back pain, joint pain and myalgias.        Pain in right shoulder and upper back   Skin: Negative for itching and rash.   Neurological: Negative for dizziness, tingling, tremors, sensory change, speech change, focal weakness, seizures, loss of consciousness and headaches.   Endo/Heme/Allergies: Negative for environmental allergies. Does not bruise/bleed easily.   Psychiatric/Behavioral: Negative for depression, hallucinations, substance abuse and suicidal ideas. The patient is not nervous/anxious and does not have insomnia.        Disposition/Barriers to discharge:   To SNF    Consultants/Specialty  Dr Esmer KIRKLAND - Neurosurgery  Dr Adán KIRKLAND - Cardiology  PCP: Pcp Unobtainable By       Quality Measures  Quality-Core Measures   Reviewed items::  Labs reviewed, Medications reviewed and Radiology images reviewed  Paul catheter::  No Paul  DVT prophylaxis pharmacological::  Enoxaparin (Lovenox)  DVT prophylaxis - mechanical:  SCDs  Ulcer Prophylaxis::  No          Physical Exam       Vitals:    11/07/18 1946 11/07/18 2300 11/08/18 0400 11/08/18 0725   BP: 103/71 110/74 100/65 119/70   Pulse: 95 98 100 77   Resp: 17 18 17 18   Temp: 37.1 °C (98.7 °F) 37.1 °C (98.8 °F) 37.2 °C (99 °F) 36.2 °C (97.1 °F)   SpO2: 95% 95% 96% 91%   Weight: 69.5 kg (153 lb 3.5 oz)      Height:         Body mass index is 25.5 kg/m². Weight: 69.5 kg (153 lb 3.5 oz)  Oxygen Therapy:  Pulse Oximetry: 91 %, O2 (LPM): 0, O2 Delivery: None (Room Air)    Physical Exam   Constitutional: She is oriented to person, place, and time and well-developed, well-nourished, and in no distress. No distress.   HENT:   Head: Normocephalic and atraumatic.   Nose: Nose normal.   Mouth/Throat: No oropharyngeal exudate.   Eyes: Pupils are equal, round, and reactive to light. Conjunctivae and EOM are normal. Right eye exhibits no discharge. Left eye exhibits no discharge.   Right sided ptosis    Neck: Normal range of motion. Neck supple.   Cardiovascular: Normal rate, regular rhythm and normal heart sounds.  Exam reveals no friction rub.    No murmur heard.  Pulmonary/Chest: Effort normal. No respiratory distress. She has no wheezes. She has no rales.   Abdominal: Soft. She exhibits no distension. There is no tenderness. There is no guarding.   Genitourinary:   Genitourinary Comments: declined   Musculoskeletal: She exhibits no edema or deformity.   Neurological: She is alert and oriented to person, place, and time.   Skin: Skin is warm and dry. No rash noted. She is not diaphoretic. No erythema.   Psychiatric: Mood and affect normal.   Nursing note and vitals reviewed.            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)   Assessment & Plan    No evidence of heart failure   Continue to control HR, increase metoprolol dose  Resume AC  Lisinopril 2.5 mg Qdaily     Paroxysmal atrial fibrillation (HCC)- (present on admission)   Assessment & Plan    - pt asymptomatic  - no pacemaker in situ  - likely secondary to chronic MR  - currently in sinus rhythm  - CHADSVASC of 3  - Echo: significant gradient across mitral valve concern for MS per Cardiology - rec Warfarin and MATI as outpatient  - currently in sinus rthythm    Plan  - Warfarin to continue, dose adjustment needed per INR  - metoprolol 37.5 BID     Meningioma (HCC)- (present on admission)   Assessment & Plan    - diagnosed 2012, managed conservatively and with radiation  - 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 bone  - SNHL, no current tinnitus  - erosion of petrous bone, midline shift and vasogenic edema, rapid increase  in size of lesion  - asymptomatic  - pt refuses treatment of meningioma  - discontinuation of Keppra and Decadron as no evidence of raised ICP  - if acute changes in mentation or neurological status can re consult neurosurgery       Hypothyroidism- (present on admission)   Assessment & Plan    - TFTs wnl  - continue home dose of Synthroid 75 mcg     History of colon cancer- (present on admission)   Assessment & Plan    - S/P resectional surgery x 3  - currently no GI complaints  - no surveillance indicated at present     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)   Assessment & Plan    - unclear etiology per caregiver  - complete loss of vision  - left eye retains vision but has cataract per hx    Plan  - fall precautions  - eye drops for lubrication  - eye patch     Bell palsy- (present on admission)   Assessment & Plan    - 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, 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

## 2018-11-08 NOTE — PROGRESS NOTES
Report received, pt care assumed, medical . VSS, pt assessment complete. Pt aaox4, no signs of distress noted at this time. POC discussed with pt and verbalizes no questions. Pt denies chest pain, pain or sob at this time. Assisted to bathroom and back. Pt denies any additional needs at this time. Bed in lowest position, bed alarm on, pt educated on fall risk and verbalized understanding, call light within reach, will continue to monitor.   Patient to be d/c to [facility] today.

## 2018-11-08 NOTE — PROGRESS NOTES
Received bedside report from KRISTY Angel, pt care assumed, client is severely hard of hearing. VSS, pt assessment complete. Pt AAOx4, c/o 7/10 pain at this time. No signs of acute distress noted at this time. POC discussed with pt and verbalizes no questions. Pt denies any additional needs at this time. Bed in lowest position, bed alarm on, pt educated on fall risk and verbalized understanding, call light within reach, hourly rounding initiated.     Discussed POC with client; Transfer to [facility] at 11 AM tomorrow / 11/8/2018. Patient expressed concerns about her belongings at the [facility] where she lives. Explained to client that her caregiver Josh was arranging for the Hotel to move client's possessions from a 2nd floor room to a 1st floor room. Patient verbalized understanding.

## 2018-11-08 NOTE — PROGRESS NOTES
Discharge instructions given to patient and placed in package. Prescriptions placed in packaget. Discharge instructions given to patient at bedside. IV cathlon removed. All belongings accounted for, all questions answered at this time. [facility].

## 2018-11-08 NOTE — DISCHARGE INSTRUCTIONS
Discharge Instructions    Discharged to other by medical transportation with self. Discharged via wheelchair, hospital escort: Yes.  Special equipment needed: Not Applicable    Be sure to schedule a follow-up appointment with your primary care doctor or any specialists as instructed.     Discharge Plan:   Diet Plan: Discussed  Activity Level: Discussed  Confirmed Follow up Appointment: No (Comments)  Confirmed Symptoms Management: Discussed  Medication Reconciliation Updated: Yes  Pneumococcal Vaccine Administered/Refused: Not given - Patient refused pneumococcal vaccine  Influenza Vaccine Indication: Patient Refuses    I understand that a diet low in cholesterol, fat, and sodium is recommended for good health. Unless I have been given specific instructions below for another diet, I accept this instruction as my diet prescription.   Other diet: Dysphagia II Nectar thick    Special Instructions: None    · Is patient discharged on Warfarin / Coumadin?   Yes    You are receiving the drug warfarin. Please understand the importance of monitoring warfarin with scheduled PT/INR blood draws.  Follow-up with a call to your personal Doctor's office in 3 days to schedule a PT/INR. .    IMPORTANT: HOW TO USE THIS INFORMATION:  This is a summary and does NOT have all possible information about this product. This information does not assure that this product is safe, effective, or appropriate for you. This information is not individual medical advice and does not substitute for the advice of your health care professional. Always ask your health care professional for complete information about this product and your specific health needs.      WARFARIN - ORAL (WARF-uh-rin)      COMMON BRAND NAME(S): Coumadin      WARNING:  Warfarin can cause very serious (possibly fatal) bleeding. This is more likely to occur when you first start taking this medication or if you take too much warfarin. To decrease your risk for bleeding, your  "doctor or other health care provider will monitor you closely and check your lab results (INR test) to make sure you are not taking too much warfarin. Keep all medical and laboratory appointments. Tell your doctor right away if you notice any signs of serious bleeding. See also Side Effects section.      USES:  This medication is used to treat blood clots (such as in deep vein thrombosis-DVT or pulmonary embolus-PE) and/or to prevent new clots from forming in your body. Preventing harmful blood clots helps to reduce the risk of a stroke or heart attack. Conditions that increase your risk of developing blood clots include a certain type of irregular heart rhythm (atrial fibrillation), heart valve replacement, recent heart attack, and certain surgeries (such as hip/knee replacement). Warfarin is commonly called a \"blood thinner,\" but the more correct term is \"anticoagulant.\" It helps to keep blood flowing smoothly in your body by decreasing the amount of certain substances (clotting proteins) in your blood.      HOW TO USE:  Read the Medication Guide provided by your pharmacist before you start taking warfarin and each time you get a refill. If you have any questions, ask your doctor or pharmacist. Take this medication by mouth with or without food as directed by your doctor or other health care professional, usually once a day. It is very important to take it exactly as directed. Do not increase the dose, take it more frequently, or stop using it unless directed by your doctor. Dosage is based on your medical condition, laboratory tests (such as INR), and response to treatment. Your doctor or other health care provider will monitor you closely while you are taking this medication to determine the right dose for you. Use this medication regularly to get the most benefit from it. To help you remember, take it at the same time each day. It is important to eat a balanced, consistent diet while taking warfarin. Some foods " can affect how warfarin works in your body and may affect your treatment and dose. Avoid sudden large increases or decreases in your intake of foods high in vitamin K (such as broccoli, cauliflower, cabbage, brussels sprouts, kale, spinach, and other green leafy vegetables, liver, green tea, certain vitamin supplements). If you are trying to lose weight, check with your doctor before you try to go on a diet. Cranberry products may also affect how your warfarin works. Limit the amount of cranberry juice (16 ounces/480 milliliters a day) or other cranberry products you may drink or eat.      SIDE EFFECTS:  Nausea, loss of appetite, or stomach/abdominal pain may occur. If any of these effects persist or worsen, tell your doctor or pharmacist promptly. Remember that your doctor has prescribed this medication because he or she has judged that the benefit to you is greater than the risk of side effects. Many people using this medication do not have serious side effects. This medication can cause serious bleeding if it affects your blood clotting proteins too much (shown by unusually high INR lab results). Even if your doctor stops your medication, this risk of bleeding can continue for up to a week. Tell your doctor right away if you have any signs of serious bleeding, including: unusual pain/swelling/discomfort, unusual/easy bruising, prolonged bleeding from cuts or gums, persistent/frequent nosebleeds, unusually heavy/prolonged menstrual flow, pink/dark urine, coughing up blood, vomit that is bloody or looks like coffee grounds, severe headache, dizziness/fainting, unusual or persistent tiredness/weakness, bloody/black/tarry stools, chest pain, shortness of breath, difficulty swallowing. Tell your doctor right away if any of these unlikely but serious side effects occur: persistent nausea/vomiting, severe stomach/abdominal pain, yellowing eyes/skin. This drug rarely has caused very serious (possibly fatal) problems if  its effects lead to small blood clots (usually at the beginning of treatment). This can lead to severe skin/tissue damage that may require surgery or amputation if left untreated. Patients with certain blood conditions (protein C or S deficiency) may be at greater risk. Get medical help right away if any of these rare but serious side effects occur: painful/red/purplish patches on the skin (such as on the toe, breast, abdomen), change in the amount of urine, vision changes, confusion, slurred speech, weakness on one side of the body. A very serious allergic reaction to this drug is rare. However, get medical help right away if you notice any symptoms of a serious allergic reaction, including: rash, itching/swelling (especially of the face/tongue/throat), severe dizziness, trouble breathing. This is not a complete list of possible side effects. If you notice other effects not listed above, contact your doctor or pharmacist. In the [place] - Call your doctor for medical advice about side effects. You may report side effects to FDA at 9-304-YLO-4366. In [place] - Call your doctor for medical advice about side effects. You may report side effects to [facility] [place] at 1-498.844.1000.      PRECAUTIONS:  Before taking warfarin, tell your doctor or pharmacist if you are allergic to it; or if you have any other allergies. This product may contain inactive ingredients, which can cause allergic reactions or other problems. Talk to your pharmacist for more details. Before using this medication, tell your doctor or pharmacist your medical history, especially of: blood disorders (such as anemia, hemophilia), bleeding problems (such as bleeding of the stomach/intestines, bleeding in the brain), blood vessel disorders (such as aneurysms), recent major injury/surgery, liver disease, alcohol use, mental/mood disorders (including memory problems), frequent falls/injuries. It is important that all your doctors and dentists know that you take  warfarin. Before having surgery or any medical/dental procedures, tell your doctor or dentist that you are taking this medication and about all the products you use (including prescription drugs, nonprescription drugs, and herbal products). Avoid getting injections into the muscles. If you must have an injection into a muscle (for example, a flu shot), it should be given in the arm. This way, it will be easier to check for bleeding and/or apply pressure bandages. This medication may cause stomach bleeding. Daily use of alcohol while using this medicine will increase your risk for stomach bleeding and may also affect how this medication works. Limit or avoid alcoholic beverages. If you have not been eating well, if you have an illness or infection that causes fever, vomiting, or diarrhea for more than 2 days, or if you start using any antibiotic medications, contact your doctor or pharmacist immediately because these conditions can affect how warfarin works. This medication can cause heavy bleeding. To lower the chance of getting cut, bruised, or injured, use great caution with sharp objects like safety razors and nail cutters. Use an electric razor when shaving and a soft toothbrush when brushing your teeth. Avoid activities such as contact sports. If you fall or injure yourself, especially if you hit your head, call your doctor immediately. Your doctor may need to check you. The Food & Drug Administration has stated that generic warfarin products are interchangeable. However, consult your doctor or pharmacist before switching warfarin products. Be careful not to take more than one medication that contains warfarin unless specifically directed by the doctor or health care provider who is monitoring your warfarin treatment. Older adults may be at greater risk for bleeding while using this drug. This medication is not recommended for use during pregnancy because of serious (possibly fatal) harm to an unborn baby.  "Discuss the use of reliable forms of birth control with your doctor. If you become pregnant or think you may be pregnant, tell your doctor immediately. If you are planning pregnancy, discuss a plan for managing your condition with your doctor before you become pregnant. Your doctor may switch the type of medication you use during pregnancy. Very small amounts of this medication may pass into breast milk but is unlikely to harm a nursing infant. Consult your doctor before breast-feeding.      DRUG INTERACTIONS:  Drug interactions may change how your medications work or increase your risk for serious side effects. This document does not contain all possible drug interactions. Keep a list of all the products you use (including prescription/nonprescription drugs and herbal products) and share it with your doctor and pharmacist. Do not start, stop, or change the dosage of any medicines without your doctor's approval. Warfarin interacts with many prescription, nonprescription, vitamin, and herbal products. This includes medications that are applied to the skin or inside the vagina or rectum. The interactions with warfarin usually result in an increase or decrease in the \"blood-thinning\" (anticoagulant) effect. Your doctor or other health care professional should closely monitor you to prevent serious bleeding or clotting problems. While taking warfarin, it is very important to tell your doctor or pharmacist of any changes in medications, vitamins, or herbal products that you are taking. Some products that may interact with this drug include: capecitabine, imatinib, mifepristone. Aspirin, aspirin-like drugs (salicylates), and nonsteroidal anti-inflammatory drugs (NSAIDs such as ibuprofen, naproxen, celecoxib) may have effects similar to warfarin. These drugs may increase the risk of bleeding problems if taken during treatment with warfarin. Carefully check all prescription/nonprescription product labels (including drugs " applied to the skin such as pain-relieving creams) since the products may contain NSAIDs or salicylates. Talk to your doctor about using a different medication (such as acetaminophen) to treat pain/fever. Low-dose aspirin and related drugs (such as clopidogrel, ticlopidine) should be continued if prescribed by your doctor for specific medical reasons such as heart attack or stroke prevention. Consult your doctor or pharmacist for more details. Many herbal products interact with warfarin. Tell your doctor before taking any herbal products, especially bromelains, coenzyme Q10, cranberry, danshen, dong quai, fenugreek, garlic, ginkgo biloba, ginseng, and Syhann's wort, among others. This medication may interfere with a certain laboratory test to measure theophylline levels, possibly causing false test results. Make sure laboratory personnel and all your doctors know you use this drug.      OVERDOSE:  If overdose is suspected, contact a poison control center or emergency room immediately.  residents can call the [employer] Poison Hotline at 1-160.408.2571. [place] residents can call a provincial poison control center. Symptoms of overdose may include: bloody/black/tarry stools, pink/dark urine, unusual/prolonged bleeding.      NOTES:  Do not share this medication with others. Laboratory and/or medical tests (such as INR, complete blood count) must be performed periodically to monitor your progress or check for side effects. Consult your doctor for more details.      MISSED DOSE:  For the best possible benefit, do not miss any doses. If you do miss a dose and remember on the same day, take it as soon as you remember. If you remember on the next day, skip the missed dose and resume your usual dosing schedule. Do not double the dose to catch up because this could increase your risk for bleeding. Keep a record of missed doses to give to your doctor or pharmacist. Contact your doctor or pharmacist if you miss 2 or more  doses in a row.      STORAGE:  Store at room temperature away from light and moisture. Do not store in the bathroom. Keep all medications away from children and pets. Do not flush medications down the toilet or pour them into a drain unless instructed to do so. Properly discard this product when it is  or no longer needed. Consult your pharmacist or local waste disposal company for more details about how to safely discard your product.      MEDICAL ALERT:  Your condition and medication can cause complications in a medical emergency. For information about enrolling in MedicAlert, call 1-528.168.1148 ([place]) or 1-746.848.2893 ([place]).      Information last revised 2010 Copyright(c)  [employer]             Atrial Fibrillation  Introduction  Atrial fibrillation is a type of heartbeat that is irregular or fast (rapid). If you have this condition, your heart keeps quivering in a weird (chaotic) way. This condition can make it so your heart cannot pump blood normally. Having this condition gives a person more risk for stroke, heart failure, and other heart problems. There are different types of atrial fibrillation. Talk with your doctor to learn about the type that you have.  Follow these instructions at home:  · Take over-the-counter and prescription medicines only as told by your doctor.  · If your doctor prescribed a blood-thinning medicine, take it exactly as told. Taking too much of it can cause bleeding. If you do not take enough of it, you will not have the protection that you need against stroke and other problems.  · Do not use any tobacco products. These include cigarettes, chewing tobacco, and e-cigarettes. If you need help quitting, ask your doctor.  · If you have apnea (obstructive sleep apnea), manage it as told by your doctor.  · Do not drink alcohol.  · Do not drink beverages that have caffeine. These include coffee, soda, and tea.  · Maintain a healthy weight. Do not use diet  pills unless your doctor says they are safe for you. Diet pills may make heart problems worse.  · Follow diet instructions as told by your doctor.  · Exercise regularly as told by your doctor.  · Keep all follow-up visits as told by your doctor. This is important.  Contact a doctor if:  · You notice a change in the speed, rhythm, or strength of your heartbeat.  · You are taking a blood-thinning medicine and you notice more bruising.  · You get tired more easily when you move or exercise.  Get help right away if:  · You have pain in your chest or your belly (abdomen).  · You have sweating or weakness.  · You feel sick to your stomach (nauseous).  · You notice blood in your throw up (vomit), poop (stool), or pee (urine).  · You are short of breath.  · You suddenly have swollen feet and ankles.  · You feel dizzy.  · Your suddenly get weak or numb in your face, arms, or legs, especially if it happens on one side of your body.  · You have trouble talking, trouble understanding, or both.  · Your face or your eyelid droops on one side.  These symptoms may be an emergency. Do not wait to see if the symptoms will go away. Get medical help right away. Call your local emergency services (911 in the [place] Do not drive yourself to the hospital.   This information is not intended to replace advice given to you by your health care provider. Make sure you discuss any questions you have with your health care provider.  Document Released: 09/26/2009 Document Revised: 05/25/2017 Document Reviewed: 04/13/2016  © 2017 [employer]  Depression / Suicide Risk    As you are discharged from this [facility] facility, it is important to learn how to keep safe from harming yourself.    Recognize the warning signs:  · Abrupt changes in personality, positive or negative- including increase in energy   · Giving away possessions  · Change in eating patterns- significant weight changes-  positive or negative  · Change in sleeping patterns- unable  to sleep or sleeping all the time   · Unwillingness or inability to communicate  · Depression  · Unusual sadness, discouragement and loneliness  · Talk of wanting to die  · Neglect of personal appearance   · Rebelliousness- reckless behavior  · Withdrawal from people/activities they love  · Confusion- inability to concentrate     If you or a loved one observes any of these behaviors or has concerns about self-harm, here's what you can do:  · Talk about it- your feelings and reasons for harming yourself  · Remove any means that you might use to hurt yourself (examples: pills, rope, extension cords, firearm)  · Get professional help from the community (Mental Health, Substance Abuse, psychological counseling)  · Do not be alone:Call your Safe Contact- someone whom you trust who will be there for you.  · Call your local CRISIS HOTLINE 963-8887 or 750-813-3528  · Call your local Children's Mobile Crisis Response Team [place] (579) 619-5029 or www.Certess  · Call the toll free National Suicide Prevention Hotlines   · National Suicide Prevention Lifeline 957-021-AOHR (4490)  · National Hope Line Network 800-SUICIDE (118-6025)

## 2018-11-08 NOTE — DISCHARGE PLANNING
Agency/Facility Name: [facility]  Spoke To: Mckenna  Outcome: Faxed D/C summary to [facility] and gave nurse's # to [facility]enna for med questions.

## 2018-11-08 NOTE — DISCHARGE PLANNING
Anticipated Discharge Disposition: SNF    Action: LSW completed [facility] packet, LSW paged [facility] team regarding D/C summary.     Barriers to Discharge: none     Plan: Pt to discharge at 11am to [facility]

## 2023-05-26 NOTE — ED NOTES
Med rec complete per caregiver over the phone  Allergies reviewed  No PO antibiotics in last 30 days   cane
